# Patient Record
Sex: MALE | Race: BLACK OR AFRICAN AMERICAN | Employment: UNEMPLOYED | URBAN - METROPOLITAN AREA
[De-identification: names, ages, dates, MRNs, and addresses within clinical notes are randomized per-mention and may not be internally consistent; named-entity substitution may affect disease eponyms.]

---

## 2017-07-24 ENCOUNTER — OFFICE VISIT (OUTPATIENT)
Dept: ALLERGY | Facility: CLINIC | Age: 37
End: 2017-07-24
Payer: COMMERCIAL

## 2017-07-24 VITALS
HEART RATE: 81 BPM | HEIGHT: 66 IN | BODY MASS INDEX: 25.26 KG/M2 | OXYGEN SATURATION: 99 % | SYSTOLIC BLOOD PRESSURE: 120 MMHG | WEIGHT: 157.2 LBS | DIASTOLIC BLOOD PRESSURE: 83 MMHG

## 2017-07-24 DIAGNOSIS — H10.9 RHINOCONJUNCTIVITIS: Primary | ICD-10-CM

## 2017-07-24 DIAGNOSIS — Z51.6 NEED FOR DESENSITIZATION TO ALLERGENS: ICD-10-CM

## 2017-07-24 DIAGNOSIS — J31.0 RHINOCONJUNCTIVITIS: Primary | ICD-10-CM

## 2017-07-24 PROCEDURE — 86003 ALLG SPEC IGE CRUDE XTRC EA: CPT | Performed by: ALLERGY & IMMUNOLOGY

## 2017-07-24 PROCEDURE — 99204 OFFICE O/P NEW MOD 45 MIN: CPT | Performed by: ALLERGY & IMMUNOLOGY

## 2017-07-24 PROCEDURE — 86003 ALLG SPEC IGE CRUDE XTRC EA: CPT | Mod: 90 | Performed by: ALLERGY & IMMUNOLOGY

## 2017-07-24 PROCEDURE — 36415 COLL VENOUS BLD VENIPUNCTURE: CPT | Performed by: ALLERGY & IMMUNOLOGY

## 2017-07-24 PROCEDURE — 99000 SPECIMEN HANDLING OFFICE-LAB: CPT | Performed by: ALLERGY & IMMUNOLOGY

## 2017-07-24 RX ORDER — AZELASTINE 1 MG/ML
2 SPRAY, METERED NASAL 2 TIMES DAILY
Qty: 1 BOTTLE | Refills: 3 | Status: SHIPPED | OUTPATIENT
Start: 2017-07-24 | End: 2018-04-09

## 2017-07-24 RX ORDER — FLUTICASONE PROPIONATE 50 MCG
2 SPRAY, SUSPENSION (ML) NASAL DAILY
COMMUNITY

## 2017-07-24 RX ORDER — CETIRIZINE HYDROCHLORIDE 10 MG/1
10 TABLET ORAL DAILY
Qty: 30 TABLET | Refills: 3 | Status: SHIPPED | OUTPATIENT
Start: 2017-07-24 | End: 2018-04-09

## 2017-07-24 RX ORDER — EPINEPHRINE 0.3 MG/.3ML
0.3 INJECTION SUBCUTANEOUS PRN
Qty: 0.6 ML | Refills: 0 | Status: SHIPPED | OUTPATIENT
Start: 2017-07-24 | End: 2018-07-09

## 2017-07-24 RX ORDER — CETIRIZINE HYDROCHLORIDE 10 MG/1
10 TABLET ORAL DAILY
COMMUNITY
End: 2017-07-24

## 2017-07-24 NOTE — NURSING NOTE
RN educated on the symptoms and treatment of anaphylaxis. Went through the different ways that a reaction can present, and the body systems that it can affect. Instructed on when to use Epinephrine Auto Injector if she has an immunotherapy injection reaction. Patient verbalized understanding.     Writer demonstrated how to use an EpiPen auto-injector.  Patient instructed to form a fist around the auto-injector, remove blue safety release by pulling straight up, then firmly push orange tip against outer thigh so it clicks, holding for 5 seconds.  Patient advised that once used, needle will not be exposed, as orange tip extends.  Patient advised to call 911 or go to emergency department after epi-pen use for further monitoring.       Allergy immunotherapy consent was signed. Patient is aware that wait time of 30 minutes is required after injection. Understands that Epinephrine Autoinjector must be brought to each appointment. If He does not have his injector, the shot will not be given. Informed that patient is responsible for any remaining balance for allergy serum after it has been submitted to insurance.     Jaelyn Claire RN

## 2017-07-24 NOTE — NURSING NOTE
"Chief Complaint   Patient presents with     Consult       Initial /83 (BP Location: Right arm, Patient Position: Sitting, Cuff Size: Adult Regular)  Pulse 81  Ht 5' 5.67\" (1.668 m)  Wt 157 lb 3.2 oz (71.3 kg)  SpO2 99%  BMI 25.63 kg/m2 Estimated body mass index is 25.63 kg/(m^2) as calculated from the following:    Height as of this encounter: 5' 5.67\" (1.668 m).    Weight as of this encounter: 157 lb 3.2 oz (71.3 kg).  Medication Reconciliation: complete   Tiara Manzo MA      "

## 2017-07-24 NOTE — PROGRESS NOTES
Roxanna Tao is a 37 year old  male with previous medical history significant for allergic rhinitis. Roxanna Tao is being seen today for evaluation of seasonal allergies.     The patient reports that for the last 11 years he has had perennial without seasonal worsening nasal and ocular symptoms. Symptoms include rhinorrhea, nasal itching, sneezing, congestion, postnasal drainage, poor sense of smell, sinus headaches, ocular itching, ocular watering, ocular redness. He underwent allergy testing 78 years ago and remembers seeing positive for dust mites. They have allergen encasements on the bedding. They have no carpet. Flonase is used 3-4 days per week, but has not been deemed beneficial. Oral antihistamine has not been beneficial. Montelukast has been beneficial. He is using a refresh eyedrop which she thinks is beneficial. No ocular antihistamines. Symptoms are present day and night. Symptoms are present in doors and outdoors. Symptoms are made worse with dust, heat, humidity and infections. Otherwise nonatopic history. No problems around cats or dogs. He has never been on allergen immunotherapy.    The patient has no history of asthma, eczema, food allergies, medications allergies or hives.     ENVIRONMENTAL HISTORY: [place] The home is heated with a forced air. They does have central air conditioning. The patient's bedroom is furnished with carpeting in bedroom, allergen mattress cover and allergen pillowcase cover.  Pets inside the house include 0 . There is not history of cockroach or mice infestation. There is/are 0 smokers in the house.  The house does not have a damp basement.     History reviewed. No pertinent past medical history.  History reviewed. No pertinent family history.  History reviewed. No pertinent surgical history.    REVIEW OF SYSTEMS:  General: negative for weight gain. negative for weight loss. negative for changes in sleep.    Ears: negative for fullness. negative for hearing loss. negative for dizziness.   Nose: positive  for snoring.positive  for changes in smell. negative for drainage.   Eyes: positive  for eye watering. positive  for eye itching. negative for vision changes. positive  for eye redness.  Throat: positive  for hoarseness. positive for sore throat. negative for trouble swallowing.   Lungs: negative for shortness of breath.negative for wheezing. negative for sputum production.   Cardiovascular: negative for chest pain. negative for swelling of ankles. negative for fast or irregular heartbeat.   Gastrointestinal: negative for nausea. negative for heartburn. negative for acid reflux.   Musculoskeletal: negative for joint pain. negative for joint stiffness. negative for joint swelling.   Neurologic: negative for seizures. negative for fainting. negative for weakness.   Psychiatric: negative for changes in mood. negative for anxiety.   Endocrine: negative for cold intolerance. negative for heat intolerance. negative for tremors.   Lymphatic: negative for lower extremity swelling. negative for lymph node swelling.   Hematologic: negative for easy bruising. negative for easy bleeding.  Integumentary: negative for rash. negative for scaling. negative for nail changes.       Current Outpatient Prescriptions:      fluticasone (FLONASE) 50 MCG/ACT spray, Spray 2 sprays into both nostrils daily, Disp: , Rfl:      Montelukast Sodium (SINGULAIR PO), , Disp: , Rfl:      azelastine (ASTELIN) 0.1 % spray, Spray 2 sprays into both nostrils 2 times daily, Disp: 1 Bottle, Rfl: 3     cetirizine (ZYRTEC) 10 MG tablet, Take 1 tablet (10 mg) by mouth daily, Disp: 30 tablet, Rfl: 3     EPINEPHrine (EPIPEN/ADRENACLICK/OR ANY BX GENERIC EQUIV) 0.3 MG/0.3ML injection 2-pack, Inject 0.3 mLs (0.3 mg) into the muscle as needed for anaphylaxis, Disp: 0.6 mL, Rfl: 0    There is no immunization history on file for this patient.  No Known  Allergies      EXAM:   Constitutional:  Appears well-developed and well-nourished. No distress.   HEENT:   Head: Normocephalic.   Right Ear: External ear normal. TM normal  Left Ear: External ear normal. TM normal  Mouth/Throat: No oropharyngeal exudate present.   Cobblestoning of posterior oropharynx.   Boggy nasal tissue and pale.    Eyes: Conjunctivae are non-erythematous   No maxillary or frontal sinus tenderness to palpation.   Cardiovascular: Normal rate, regular rhythm and normal heart sounds. Exam reveals no gallop and no friction rub.   No murmur heard.  Respiratory: Effort normal and breath sounds normal. No respiratory distress. No wheezes. No rales.   Musculoskeletal: Normal range of motion.   Lymphadenopathy:   No cervical adenopathy.   No lower extremity edema.   Neuro: Oriented to person, place, and time.  Skin: Skin is warm and dry. No rash noted.   Psychiatric: Normal mood and affect.     Nursing note and vitals reviewed.    ASSESSMENT/PLAN:  Problem List Items Addressed This Visit        Infectious/Inflammatory    Rhinoconjunctivitis - Primary     Perennial nasal and ocular symptoms for the last 11 years. Flonase has been used and non-beneficial. Zyrtec, montelukast and other oral antihistamines have been non-beneficial. Using refresh eyedrops. Allergy testing 78 years ago positive for at least dust mites. Using that a pot and this has been beneficial. No history of allergy shots.    - Blood testing for environmental allergens. Could not obtain skin testing today secondary to antihistamine use.  - Flonase 50mcg 2 sprays/nostril q day. Use consistently.   - Azelastine 2 sprays/nostril twice daily as needed.   - Cetirizine (Zyrtec) 10mg by mouth daily.   - The patient has a history of allergic rhinoconjunctivitis and has exhausted all medical therapies without success in controlling symptoms. Immunotherapy was discussed as a treatment option with patient and family. Risks/benefits of immunotherapy  were discussed and the patient/family wishes to proceed with allergen immunotherapy.   - The patient will be prescribed a injectable epinephrine device and will need to bring this with them to allergy shot appointments and carry with them for the rest of the day after they receive the allergy shot. Discussed signs and symptoms of a systemic reaction and when to use injectable epinephrine.   - Oral antihistamine daily prior to receiving allergy shot.   - Return to clinic in 6 weeks.            Relevant Medications    azelastine (ASTELIN) 0.1 % spray    cetirizine (ZYRTEC) 10 MG tablet    Other Relevant Orders    Allergen cat epithellium IgE    Allergen dog epithelium IgE    Allergen Santy grass IgE    Allergen arely IgE    Allergen D pteronyssinus IgE    Allergen D farinae IgE    Allergen alternaria alternata IgE    Allergen aspergillus fumigatus IgE    Allergen cladosporium herbarum IgE    Allergen Epicoccum purpurascens IgE    Allergen penicillium notatum IgE    Allergen Red [place] IgE    Allergen lulu white IgE    Allergen Cedar IgE    Allergen silver  birch IgE    Allergen Tree White [place] IgE    Allergen elm IgE    Allergen cottonwood IgE    Allergen maple box elder IgE    Allergen oak white IgE    Allergen white pine IgE    Allergen [place] Tree    Allergen English plantain IgE    Allergen giant ragweed IgE    Allergen lamb's quarter IgE    Allergen Mugwort IgE    Allergen ragweed short IgE    Allergen Sagebrush Wormwood IgE    Allergen Sheep Sorrel IgE    Allergen thistle Russian IgE    Allergen Weed Nettle IgE    Allergen, Kochia/Firebush      Other Visit Diagnoses     Need for desensitization to allergens        Relevant Medications    EPINEPHrine (EPIPEN/ADRENACLICK/OR ANY BX GENERIC EQUIV) 0.3 MG/0.3ML injection 2-pack          Chart documentation with Dragon Voice recognition Software. Although reviewed after completion, some words and grammatical errors may remain.    Ray Mcleod,     Allergy/Immunology  [facility], [place], [place]

## 2017-07-24 NOTE — MR AVS SNAPSHOT
After Visit Summary   7/24/2017    Roxanna Tao    MRN: [id number]           Patient Information     Date Of Birth          1980        Visit Information        Provider Department      7/24/2017 10:45 AM Ray Mcleod DO; [facility]        Today's Diagnoses     Rhinoconjunctivitis    -  1    Need for desensitization to allergens          Care Instructions    Allergy Staff Appt Hours Shot Hours Locations    Physician     Ray Mcleod DO       Support Staff     SHLOMO Agustin MA  Monday:                      [place] 8-7     Tuesday:         [place] 8-5     Wednesday:        [place]: 7-5 Friday:        [place] 7-5 [place] Monday: 9-6 Friday: 7-2     [place]        Tuesday: 7-12 Thursday: 1-6 [place] Tuesday: 1-6 Wednesday: 11-6 Thursday: 7-12 [facility]  [address] [place] [address]  Appt Line: [phone]  Allergy RN (Monday):  [phone]    [facility]  [address] [place] [address]  Appt Line: [phone]  Allergy RN (Tues & Wed):  [phone]    [facility]  [address] [place] [address]  Appt Line: [phone]  Allergy RN (Friday):  [phone]       Important Scheduling Information  Aspirin Desensitization: Appt will last 2 clinic days. Please call the Allergy RN line for your clinic to schedule. Discontinue antihistamines 7 days prior to the appointment.     Food Challenges: Appt will last 3-4 hours. Please call the Allergy RN line for your clinic to schedule. Discontinue antihistamines 7 days prior to the appointment.     Penicillin Testing: Appt will last 2-3 hours. Please call the Allergy RN line for your clinic to schedule. Discontinue antihistamines 7 days prior to the appointment.     Skin Testing: Appt will about 40 minutes. Call the appointment line for your clinic to schedule. Discontinue antihistamines 7  days prior to the appointment.     Venom Testing: Appt will last 2-3 hours. Please call the Allergy RN line for your clinic to schedule. Discontinue antihistamines 7 days prior to the appointment.     Thank you for trusting us with your Allergy, Asthma, and Immunology care. Please feel free to contact us with any questions or concerns you may have.      - Flonase 50mcg 2 sprays/nostril daily.   - Azelastine 2 sprays/nostril twice daily as needed.   - Cetirizine (Zyrtec) 10mg by mouth daily.   - Okay to stay off of Singulair.   - Allergy blood testing today.   - Start allergy shots.   AEROALLERGEN AVOIDANCE INSTRUCTIONS  MOLD  Indoors, mold season is year round. Outdoors, most mold prefer seasons with high humidity. Mold prefers damp, dark, warm places. Here are some tips on how to avoid mold exposure.  1.  Keep humidity inside between 35-50% with air conditioning or dehumidifier. The humidity level can be checked with a meter from a hardware store.   2.  Clean surfaces where mold grows and dry wet areas.  3.  Avoid steam cleaning carpets and discard moldy belongings.  4.  Wear a mask when doing yard work and refrain from walking through uncut fields or playing in leaves.  5.  Minimize use of potted plants and do not keep them indoors.  6.  Consider an allergy cover for the pillow and mattress.  POLLEN  Pollens are the tiny airborne particles given off by trees, weeds, and grasses. They can be the cause of seasonal allergic rhinitis or hay fever symptoms, which include stuffy, itchy, runny nose, redness, swelling and itching of the eyes, and itching of the ears and throat. Here are some tips on how to avoid pollen exposure.  1. .Keep windows closed and use the air conditioner when possible.  2.  Avoid outside exposure in the early morning as pollen counts are highest at that time.  3.  Take a shower and wash hair each night.  4.  Consider wearing a mask when working in the yard and/or garden.  5.  Clean furnace  filter monthly with HEPA filters. Consider a HEPA filter vacuum  which will prevent pollen from being reintroduced into the air.   DUST MITES  Dust mites can never be entirely eliminated in the house no matter how clean your house is. Dust mites are attracted to warm, moist areas and feed on dead skin flakes. Here are tips to minimize dust mites in your home.  1.  Encase pillows and mattress/box springs in zippered allergy covers.  2.  Wash bedding in hot water (at least 130 F) every 7-14 days.  3.  Avoid curtains, carpet, and upholstered furniture if possible.  4.  Use HEPA air filters and a HEPA filter vacuum . Change filters monthly. Vacuum weekly.  5.  Keep bedroom simple, avoiding clutter, so it can quickly be dusted.  6.  Cover heating vents with vent filters.  7.  Keep stuffed toys in a closed container and wash or freeze regularly.  8.  Keep clothing in the closet with the door closed.  PETS  Pets present many problems for people with allergies. Dander from pets is very difficult to remove and also is a food source for dust mites.  1.  If possible, find the pet a new home.  2.  If not possible, keep the pet outdoors. Never allow the pet into the bedroom.  3.  Wash pet weekly in warm water.  4.  Encase mattresses, pillows, and box springs in allergen-proof covers.  5.  Use HEPA air filters and a HEPA filter vacuum . Change filters monthly.  Anaphylaxis Action Plan for Immunotherapy Patients    There is risk of systemic reactions when receiving immunotherapy injections. Local reactions such as a wheal (hive) smaller than a quarter, redness, swelling, and soreness are common. If the wheal is greater than the size of a half dollar (3.4 cm) please contact our clinic (numbers below), as we will need to adjust the dose that you receive at your next injection. Waiting until the next appointment to inform us of the reaction could increase the wait time that you experience, because your allergist  "will need to be contacted for new orders prior to giving the injection.  If you have symptoms not localized to the injection site, please follow the anaphylaxis plan, and contact our office to update after you have received emergency medical treatment. Please ask to speak to an Allergy RN with any questions or updates.     [facility]: [phone]  [facility] [phone]  [facility]: [phone]  [place]: [phone]  [phone]                Follow-ups after your visit        Follow-up notes from your care team     Return in about 6 weeks (around 2017).      Who to contact     If you have questions or need follow up information about today's clinic visit or your schedule please contact [facility] directly at [phone].  Normal or non-critical lab and imaging results will be communicated to you by MyChart, letter or phone within 4 business days after the clinic has received the results. If you do not hear from us within 7 days, please contact the clinic through MyChart or phone. If you have a critical or abnormal lab result, we will notify you by phone as soon as possible.  Submit refill requests through [employer] or call your pharmacy and they will forward the refill request to us. Please allow 3 business days for your refill to be completed.          Additional Information About Your Visit        MyChart Information     [employer] lets you send messages to your doctor, view your test results, renew your prescriptions, schedule appointments and more. To sign up, go to [email or web address] . Click on \"Log in\" on the left side of the screen, which will take you to the Welcome page. Then click on \"Sign up Now\" on the right side of the page.     You will be asked to enter the access code listed below, as well as some personal information. Please follow the directions to create your username and password.     Your access code is: [email or web address]  Expires: 10/22/2017 11:35 " "AM     Your access code will  in 90 days. If you need help or a new code, please call your [place] clinic or [phone].        Care EveryWhere ID     This is your Care EveryWhere ID. This could be used by other organizations to access your [place] medical records  [id number]        Your Vitals Were     Pulse Height Pulse Oximetry BMI (Body Mass Index)          81 5' 5.67\" (1.668 m) 99% 25.63 kg/m2         Blood Pressure from Last 3 Encounters:   17 120/83    Weight from Last 3 Encounters:   17 157 lb 3.2 oz (71.3 kg)              We Performed the Following     Allergen alternaria alternata IgE     Allergen lulu white IgE     Allergen aspergillus fumigatus IgE     Allergen cat epithellium IgE     Allergen Cedar IgE     Allergen cladosporium herbarum IgE     Allergen cottonwood IgE     Allergen D farinae IgE     Allergen D pteronyssinus IgE     Allergen dog epithelium IgE     Allergen elm IgE     Allergen English plantain IgE     Allergen Epicoccum purpurascens IgE     Allergen giant ragweed IgE     Allergen Santy grass IgE     Allergen lamb's quarter IgE     Allergen maple box elder IgE     Allergen Mugwort IgE     Allergen oak white IgE     Allergen penicillium notatum IgE     Allergen ragweed short IgE     Allergen Red [place] IgE     Allergen Sagebrush [place] IgE     Allergen Sheep Sorrel IgE     Allergen silver  birch IgE     Allergen thistle Russian IgE     Allergen arely IgE     Allergen Tree White [place] IgE     Allergen [place] Tree     Allergen Weed Nettle IgE     Allergen white pine IgE     Allergen, Kochia/Firebush          Today's Medication Changes          These changes are accurate as of: 17 11:51 AM.  If you have any questions, ask your nurse or doctor.               Start taking these medicines.        Dose/Directions    azelastine 0.1 % spray   Commonly known as:  ASTELIN   Used for:  Rhinoconjunctivitis   Started by:  Ray Mcleod, DO        Dose:  2 spray "   Spray 2 sprays into both nostrils 2 times daily   Quantity:  1 Bottle   Refills:  3       EPINEPHrine 0.3 MG/0.3ML injection 2-pack   Commonly known as:  EPIPEN/ADRENACLICK/or ANY BX GENERIC EQUIV   Used for:  Need for desensitization to allergens   Started by:  Ray Mcleod DO        Dose:  0.3 mg   Inject 0.3 mLs (0.3 mg) into the muscle as needed for anaphylaxis   Quantity:  0.6 mL   Refills:  0            Where to get your medicines      These medications were sent to [id number] - [place], [place] - [address]  [address], [place] [address]    Hours:  test fax sent successfully 7/31/03  Phone:  [phone]     azelastine 0.1 % spray    cetirizine 10 MG tablet    EPINEPHrine 0.3 MG/0.3ML injection 2-pack                Primary Care Provider    None Specified       No primary provider on file.        Equal Access to Services     [facility] : Lilliana Steen, waanjuda tami, qaybta kaalmada katelyn, viktor winters . So [facility] [phone].    ATENCIÓN: Si ayala olvera, tiene a mc disposición servicios gratuitos de asistencia lingüística. Llame al [phone].    We comply with applicable federal civil rights laws and [place] laws. We do not discriminate on the basis of race, color, national origin, age, disability sex, sexual orientation or gender identity.            Thank you!     Thank you for choosing [facility]  for your care. Our goal is always to provide you with excellent care. Hearing back from our patients is one way we can continue to improve our services. Please take a few minutes to complete the written survey that you may receive in the mail after your visit with us. Thank you!             Your Updated Medication List - Protect others around you: Learn how to safely use, store and throw away your medicines at www.disposemymeds.org.          This list is accurate as of: 7/24/17 11:51 AM.   Always use your most recent med list.                   Brand Name Dispense Instructions for use Diagnosis    azelastine 0.1 % spray    ASTELIN    1 Bottle    Spray 2 sprays into both nostrils 2 times daily    Rhinoconjunctivitis       cetirizine 10 MG tablet    zyrTEC    30 tablet    Take 1 tablet (10 mg) by mouth daily    Rhinoconjunctivitis       EPINEPHrine 0.3 MG/0.3ML injection 2-pack    EPIPEN/ADRENACLICK/or ANY BX GENERIC EQUIV    0.6 mL    Inject 0.3 mLs (0.3 mg) into the muscle as needed for anaphylaxis    Need for desensitization to allergens       fluticasone 50 MCG/ACT spray    FLONASE     Spray 2 sprays into both nostrils daily        SINGULAIR PO

## 2017-07-24 NOTE — ASSESSMENT & PLAN NOTE
Perennial nasal and ocular symptoms for the last 11 years. Flonase has been used and non-beneficial. Zyrtec, montelukast and other oral antihistamines have been non-beneficial. Using refresh eyedrops. Allergy testing 78 years ago positive for at least dust mites. Using that a pot and this has been beneficial. No history of allergy shots.    - Blood testing for environmental allergens. Could not obtain skin testing today secondary to antihistamine use.  - Flonase 50mcg 2 sprays/nostril q day. Use consistently.   - Azelastine 2 sprays/nostril twice daily as needed.   - Cetirizine (Zyrtec) 10mg by mouth daily.   - The patient has a history of allergic rhinoconjunctivitis and has exhausted all medical therapies without success in controlling symptoms. Immunotherapy was discussed as a treatment option with patient and family. Risks/benefits of immunotherapy were discussed and the patient/family wishes to proceed with allergen immunotherapy.   - The patient will be prescribed a injectable epinephrine device and will need to bring this with them to allergy shot appointments and carry with them for the rest of the day after they receive the allergy shot. Discussed signs and symptoms of a systemic reaction and when to use injectable epinephrine.   - Oral antihistamine daily prior to receiving allergy shot.   - Return to clinic in 6 weeks.

## 2017-07-24 NOTE — PATIENT INSTRUCTIONS
Allergy Staff Appt Hours Shot Hours Locations    Physician     Ray Mcleod DO       Support Staff     Jaelyn PATHAK RN      Tiara LYON MA  Monday:                      [place] 8-7     Tuesday:         [place] 8-5 Wednesday:        [place]: 7-5     Friday:        [place] 7-5   [place]        Monday: 9-6        Friday: 7-2     [place]        Tuesday: 7-12 Thursday: 1-6 [place] Tuesday: 1-6 Wednesday: 11-6 Thursday: 7-12 [facility]  [address] [place] [address]  Appt Line: [phone]  Allergy RN (Monday):  [phone]    [facility]  [address] [place] [address]  Appt Line: [phone]  Allergy RN (Tues & Wed):  [phone]    [facility]  [address] [place] [address]  Appt Line: [phone]  Allergy RN (Friday):  [phone]       Important Scheduling Information  Aspirin Desensitization: Appt will last 2 clinic days. Please call the Allergy RN line for your clinic to schedule. Discontinue antihistamines 7 days prior to the appointment.     Food Challenges: Appt will last 3-4 hours. Please call the Allergy RN line for your clinic to schedule. Discontinue antihistamines 7 days prior to the appointment.     Penicillin Testing: Appt will last 2-3 hours. Please call the Allergy RN line for your clinic to schedule. Discontinue antihistamines 7 days prior to the appointment.     Skin Testing: Appt will about 40 minutes. Call the appointment line for your clinic to schedule. Discontinue antihistamines 7 days prior to the appointment.     Venom Testing: Appt will last 2-3 hours. Please call the Allergy RN line for your clinic to schedule. Discontinue antihistamines 7 days prior to the appointment.     Thank you for trusting us with your Allergy, Asthma, and Immunology care. Please feel free to contact us with any questions or concerns you may have.      - Flonase 50mcg 2 sprays/nostril daily.   - Azelastine 2 sprays/nostril  twice daily as needed.   - Cetirizine (Zyrtec) 10mg by mouth daily.   - Okay to stay off of Singulair.   - Allergy blood testing today.   - Start allergy shots.   AEROALLERGEN AVOIDANCE INSTRUCTIONS  MOLD  Indoors, mold season is year round. Outdoors, most mold prefer seasons with high humidity. Mold prefers damp, dark, warm places. Here are some tips on how to avoid mold exposure.  1.  Keep humidity inside between 35-50% with air conditioning or dehumidifier. The humidity level can be checked with a meter from a hardware store.   2.  Clean surfaces where mold grows and dry wet areas.  3.  Avoid steam cleaning carpets and discard moldy belongings.  4.  Wear a mask when doing yard work and refrain from walking through uncut fields or playing in leaves.  5.  Minimize use of potted plants and do not keep them indoors.  6.  Consider an allergy cover for the pillow and mattress.  POLLEN  Pollens are the tiny airborne particles given off by trees, weeds, and grasses. They can be the cause of seasonal allergic rhinitis or hay fever symptoms, which include stuffy, itchy, runny nose, redness, swelling and itching of the eyes, and itching of the ears and throat. Here are some tips on how to avoid pollen exposure.  1. .Keep windows closed and use the air conditioner when possible.  2.  Avoid outside exposure in the early morning as pollen counts are highest at that time.  3.  Take a shower and wash hair each night.  4.  Consider wearing a mask when working in the yard and/or garden.  5.  Clean furnace filter monthly with HEPA filters. Consider a HEPA filter vacuum  which will prevent pollen from being reintroduced into the air.   DUST MITES  Dust mites can never be entirely eliminated in the house no matter how clean your house is. Dust mites are attracted to warm, moist areas and feed on dead skin flakes. Here are tips to minimize dust mites in your home.  1.  Encase pillows and mattress/box springs in zippered allergy  covers.  2.  Wash bedding in hot water (at least 130 F) every 7-14 days.  3.  Avoid curtains, carpet, and upholstered furniture if possible.  4.  Use HEPA air filters and a HEPA filter vacuum . Change filters monthly. Vacuum weekly.  5.  Keep bedroom simple, avoiding clutter, so it can quickly be dusted.  6.  Cover heating vents with vent filters.  7.  Keep stuffed toys in a closed container and wash or freeze regularly.  8.  Keep clothing in the closet with the door closed.  PETS  Pets present many problems for people with allergies. Dander from pets is very difficult to remove and also is a food source for dust mites.  1.  If possible, find the pet a new home.  2.  If not possible, keep the pet outdoors. Never allow the pet into the bedroom.  3.  Wash pet weekly in warm water.  4.  Encase mattresses, pillows, and box springs in allergen-proof covers.  5.  Use HEPA air filters and a HEPA filter vacuum . Change filters monthly.  Anaphylaxis Action Plan for Immunotherapy Patients    There is risk of systemic reactions when receiving immunotherapy injections. Local reactions such as a wheal (hive) smaller than a quarter, redness, swelling, and soreness are common. If the wheal is greater than the size of a half dollar (3.4 cm) please contact our clinic (numbers below), as we will need to adjust the dose that you receive at your next injection. Waiting until the next appointment to inform us of the reaction could increase the wait time that you experience, because your allergist will need to be contacted for new orders prior to giving the injection.  If you have symptoms not localized to the injection site, please follow the anaphylaxis plan, and contact our office to update after you have received emergency medical treatment. Please ask to speak to an Allergy RN with any questions or updates.     [facility]: [phone]  [facility]): [phone]  [facility]: [phone]  [place]:  [phone]  [phone]

## 2017-07-25 LAB
CALIF WALNUT IGE QN: NORMAL
DEPRECATED MISC ALLERGEN IGE RAST QL: NORMAL

## 2017-07-27 LAB
A ALTERNATA IGE QN: NORMAL KU(A)/L
A FUMIGATUS IGE QN: NORMAL KU(A)/L
C HERBARUM IGE QN: NORMAL KU(A)/L
CAT DANDER IGG QN: NORMAL KU(A)/L
CEDAR IGE QN: NORMAL KU(A)/L
COMMON RAGWEED IGE QN: NORMAL KU(A)/L
COTTONWOOD IGE QN: NORMAL KU(A)/L
D FARINAE IGE QN: 1.27 KU(A)/L
D PTERONYSS IGE QN: 1.99 KU(A)/L
DOG DANDER+EPITH IGE QN: NORMAL KU(A)/L
E PURPURASCENS IGE QN: NORMAL KU(A)/L
EAST WHITE PINE IGE QN: NORMAL KU(A)/L
ENGL PLANTAIN IGE QN: NORMAL KU(A)/L
FIREBUSH IGE QN: NORMAL KU(A)/L
GIANT RAGWEED IGE QN: NORMAL KU(A)/L
GOOSEFOOT IGE QN: 0.12 KU(A)/L
JOHNSON GRASS IGE QN: NORMAL KU(A)/L
MAPLE IGE QN: NORMAL KU(A)/L
MUGWORT IGE QN: NORMAL KU(A)/L
P NOTATUM IGE QN: NORMAL KU(A)/L
RED MULBERRY IGE QN: NORMAL KU(A)/L
SALTWORT IGE QN: NORMAL KU(A)/L
SHEEP SORREL IGE QN: NORMAL KU(A)/L
SILVER BIRCH IGE QN: NORMAL KU(A)/L
TIMOTHY IGE QN: NORMAL KU(A)/L
WHITE ASH IGE QN: 0.1 KU(A)/L
WHITE ELM IGE QN: NORMAL KU(A)/L
WHITE OAK IGE QN: NORMAL KU(A)/L
WORMWOOD IGE QN: NORMAL KU(A)/L

## 2017-07-27 NOTE — PROGRESS NOTES
Blood testing positive for dust mites, weeds and trees. Will create allergy shots based on these results. Please call and confirm with the patient that he wishes to proceed. I think he does, but he maybe checking with insurance. Thanks.     AEROALLERGEN AVOIDANCE INSTRUCTIONS  MOLD  Indoors, mold season is year round. Outdoors, most mold prefer seasons with high humidity. Mold prefers damp, dark, warm places. Here are some tips on how to avoid mold exposure.   Keep humidity inside between 35-50% with air conditioning or dehumidifier. The humidity level can be checked with a meter from a hardware store.    Clean surfaces where mold grows and dry wet areas.   Avoid steam cleaning carpets and discard moldy belongings.   Wear a mask when doing yard work and refrain from walking through uncut fields or playing in leaves.   Minimize use of potted plants and do not keep them indoors.   Consider an allergy cover for the pillow and mattress.  POLLEN  Pollens are the tiny airborne particles given off by trees, weeds, and grasses. They can be the cause of seasonal allergic rhinitis or hay fever symptoms, which include stuffy, itchy, runny nose, redness, swelling and itching of the eyes, and itching of the ears and throat. Here are some tips on how to avoid pollen exposure.  .Keep windows closed and use the air conditioner when possible.   Avoid outside exposure in the early morning as pollen counts are highest at that time.   Take a shower and wash hair each night.   Consider wearing a mask when working in the yard and/or garden.   Clean furnace filter monthly with HEPA filters. Consider a HEPA filter vacuum  which will prevent pollen from being reintroduced into the air.   DUST MITES  Dust mites can never be entirely eliminated in the house no matter how clean your house is. Dust mites are attracted to warm, moist areas and feed on dead skin flakes. Here are tips to minimize dust mites in your home.   Encase pillows  and mattress/box springs in zippered allergy covers.   Wash bedding in hot water (at least 130 F) every 7-14 days.   Avoid curtains, carpet, and upholstered furniture if possible.   Use HEPA air filters and a HEPA filter vacuum . Change filters monthly. Vacuum weekly.   Keep bedroom simple, avoiding clutter, so it can quickly be dusted.   Cover heating vents with vent filters.   Keep stuffed toys in a closed container and wash or freeze regularly.   Keep clothing in the closet with the door closed.  PETS  Pets present many problems for people with allergies. Dander from pets is very difficult to remove and also is a food source for dust mites.   If possible, find the pet a new home.   If not possible, keep the pet outdoors. Never allow the pet into the bedroom.   Wash pet weekly in warm water.   Encase mattresses, pillows, and box springs in allergen-proof covers.   Use HEPA air filters and a HEPA filter vacuum . Change filters monthly.      Ray Mcleod

## 2017-07-28 LAB
NETTLE IGE QN: NORMAL KU(A)/L
WHITE MULBERRY IGE QN: NORMAL

## 2017-08-14 ENCOUNTER — TELEPHONE (OUTPATIENT)
Dept: ALLERGY | Facility: CLINIC | Age: 37
End: 2017-08-14

## 2017-08-14 DIAGNOSIS — J30.89 ALLERGIC RHINITIS DUE TO DUST MITE: Primary | ICD-10-CM

## 2017-08-14 NOTE — TELEPHONE ENCOUNTER
Patient left message on RN's voicemail stating that allergy immunotherapy serums can be ordered. Routing to provider to order serums.     Jaelyn Claire RN

## 2017-08-14 NOTE — TELEPHONE ENCOUNTER
Patient is calling stating he received a message from nurse about his allergy shots and would like to discuss. Please call to discuss. Thank you.

## 2017-08-14 NOTE — TELEPHONE ENCOUNTER
ALLERGY SOLUTION NEW REQUEST    Roxanna WAGNER Dinh 1980 MRN: [id number]    DATE NEEDED:  2 weeks  Vial Color Content   Top Dose         Vial Size  Green 1:1,000, Blue 1:100, Yellow 1:10 and Red 1:1 Dust Mite, Trees, Weeds  Red 1:1 0.5 5mL      Shot Clinic Location:  [place]  Ship to Location: [place]  Special Instructions:  none      Updated Prescription Needed: No      Requester Signature  aRy Mcleod

## 2017-08-14 NOTE — TELEPHONE ENCOUNTER
RN left message for patient to return call to RN's direct line @ [phone].    Jaelyn Claire RN

## 2017-08-18 ENCOUNTER — TELEPHONE (OUTPATIENT)
Dept: ALLERGY | Facility: CLINIC | Age: 37
End: 2017-08-18

## 2017-08-18 DIAGNOSIS — J30.2 SEASONAL ALLERGIC RHINITIS: Primary | ICD-10-CM

## 2017-08-18 PROCEDURE — 95165 ANTIGEN THERAPY SERVICES: CPT | Performed by: ALLERGY & IMMUNOLOGY

## 2017-08-18 NOTE — TELEPHONE ENCOUNTER
Allergy serums received at [place].     Vials received below:    Vial Color Content                      Vial Size Expiration Date  Green 1:1,000 Dust Mite, Trees, Weeds 5mL  2/16/2018  Blue 1:100 Dust Mite, Trees, Weeds 5mL  8/16/2018  Yellow 1:10 Dust Mite, Trees, Weeds 5mL  8/16/2018  Red 1:1 Dust Mite, Trees, Weeds 5mL  8/16/2018      Karri eLmus

## 2017-08-18 NOTE — TELEPHONE ENCOUNTER
Called pt and left message stating that allergy serum has been received at the [facility]. Ok to set up appointments to receive allergy injection every 3-14 days. Gave pt main clinic number to schedule appointments. Also advised pt to bring epipen to every allergy injection appointment and ok to continue allergy medication while receiving allergy injections. Request pt call [phone] with any further questions.    Rita Lemus CMA ....... 8/18/2017 8:23 AM

## 2017-08-18 NOTE — PROGRESS NOTES
Allergy serums billed at [place].     Vials received below:    Vial Color Content                      Vial Size Expiration Date  Green 1:1,000 Dust Mite, Trees, Weeds 5mL  2/16/2018  Blue 1:100 Dust Mite, Trees, Weeds 5mL  8/16/2018  Yellow 1:10 Dust Mite, Trees, Weeds 5mL  8/16/2018  Red 1:1 Dust Mite, Trees, Weeds 5mL  8/16/2017    Original Refill encounter date: 8/14/2017      Signature  Rita Lemus

## 2017-09-01 ENCOUNTER — ALLIED HEALTH/NURSE VISIT (OUTPATIENT)
Dept: ALLERGY | Facility: CLINIC | Age: 37
End: 2017-09-01
Payer: COMMERCIAL

## 2017-09-01 DIAGNOSIS — J30.9 ALLERGIC RHINITIS, UNSPECIFIED: Primary | ICD-10-CM

## 2017-09-01 PROCEDURE — 95115 IMMUNOTHERAPY ONE INJECTION: CPT

## 2017-09-01 NOTE — PROGRESS NOTES
Patient started allergy injections today. Reviewed new patient instructions including:     Prior to visit patient should:   - Bring epinephrine auto-injector to every appointment, this is for patient safety  - We reviewed how to use your epinephrine auto-injector if needed   -  If directed by your provider, take an over the counter anti-histamine at least 60 minutes prior to appointment (allegra, claritin, zyrtec are all options)   - This can be in addition to medications patient is already taking for allergy  symptoms    At time of visit:   -Patient will be asked a series of questions every time, reviewed these questions and implications.   -Patient must present epinephrine auto-injector  -Patient must wait 30 minutes post injections, reviewed this is for patient safety. Patient should watch the time/and return to injection room for assessment of sites before leaving.  -If patient were to experience signs of a systemic reaction (reviewed what these are), patient should present to injection room and notify nursing staff immediately   -Patient may not go to other appointments/activities within the clinic during the 30 minute wait time.     Post Visit:   -Reviewed signs of systemic reaction/anaphylaxis and what to do if these were to occur  -Reviewed signs of a normal local reaction and when to call the clinic  -Reviewed dosing schedule, and assisted with/or encouraged patient to schedule additional appointments  -Avoid avid vigorous activity from right before each injection until 2 hours after the injection     *Patient handout given with this information.     Vandana Norris RN

## 2017-09-01 NOTE — PROGRESS NOTES
Patient presented after waiting 30 minutes with no reaction to allergy injections. Discharged from clinic.    Vandana Norris RN ....... 9/1/2017 1:46 PM

## 2017-09-01 NOTE — MR AVS SNAPSHOT
After Visit Summary   9/1/2017    Roxanna Tao    MRN: [id number]           Patient Information     Date Of Birth          1980        Visit Information        Provider Department      9/1/2017 1:00 PM AN ALLERGY SHOTS [facility] Instructions    Patient started allergy injections today. Reviewed new patient instructions including:     Prior to visit patient should:   - Bring epinephrine auto-injector to every appointment, this is for patient safety  - We reviewed how to use your epinephrine auto-injector if needed   -  If directed by your provider, take an over the counter anti-histamine at least 60 minutes prior to appointment (allegra, claritin, zyrtec are all options)   - This can be in addition to medications patient is already taking for allergy  symptoms    At time of visit:   -Patient will be asked a series of questions every time, reviewed these questions and implications.   -Patient must present epinephrine auto-injector  -Patient must wait 30 minutes post injections, reviewed this is for patient safety. Patient should watch the time/and return to injection room for assessment of sites before leaving.  -If patient were to experience signs of a systemic reaction (reviewed what these are), patient should present to injection room and notify nursing staff immediately   -Patient may not go to other appointments/activities within the clinic during the 30 minute wait time.     Post Visit:   -Reviewed signs of systemic reaction/anaphylaxis and what to do if these were to occur  -Reviewed signs of a normal local reaction and when to call the clinic  -Reviewed dosing schedule, and assisted with/or encouraged patient to schedule additional appointments  -Avoid avid vigorous activity from right before each injection until 2 hours after the injection     *Patient handout given with this information.     Anaphylaxis Action Plan for Immunotherapy Patients    There is risk of  systemic reactions when receiving immunotherapy injections. Local reactions such as a wheal (hive) smaller than a quarter, redness, swelling, and soreness are common. If the wheal is greater than the size of a half dollar (3.4 cm) please contact our clinic (numbers below), as we will need to adjust the dose that you receive at your next injection. Waiting until the next appointment to inform us of the reaction could increase the wait time that you experience, because your allergist will need to be contacted for new orders prior to giving the injection.  If you have symptoms not localized to the injection site, please follow the anaphylaxis plan, and contact our office to update after you have received emergency medical treatment. Please ask to speak to an Allergy RN with any questions or updates.     [facility]: [phone]  [facility]: [phone]  [facility]: [phone]  [place]: [phone]  [phone]                Follow-ups after your visit        Your next 10 appointments already scheduled     Sep 01, 2017  1:00 PM CDT   Nurse Only with AN ALLERGY SHOTS   [facility]    [address] [address] [phone]            Sep 08, 2017  1:00 PM CDT   Nurse Only with AN ALLERGY SHOTS   [facility]    [address] [address] [phone]              Who to contact     If you have questions or need follow up information about today's clinic visit or your schedule please contact [facility] directly at [phone].  Normal or non-critical lab and imaging results will be communicated to you by MyChart, letter or phone within 4 business days after the clinic has received the results. If you do not hear from us within 7 days, please contact the clinic through MyChart or phone. If you have a critical or abnormal lab result, we will notify you by phone  "as soon as possible.  Submit refill requests through LOANZ or call your pharmacy and they will forward the refill request to us. Please allow 3 business days for your refill to be completed.          Additional Information About Your Visit        [employer] Information     LOANZ lets you send messages to your doctor, view your test results, renew your prescriptions, schedule appointments and more. To sign up, go to [email or web address] . Click on \"Log in\" on the left side of the screen, which will take you to the Welcome page. Then click on \"Sign up Now\" on the right side of the page.     You will be asked to enter the access code listed below, as well as some personal information. Please follow the directions to create your username and password.     Your access code is: [email or web address]  Expires: 10/22/2017 11:35 AM     Your access code will  in 90 days. If you need help or a new code, please call your [place] clinic or [phone].        Care EveryWhere ID     This is your Care EveryWhere ID. This could be used by other organizations to access your [place] medical records  [id number]         Blood Pressure from Last 3 Encounters:   17 120/83    Weight from Last 3 Encounters:   17 71.3 kg (157 lb 3.2 oz)              Today, you had the following     No orders found for display       Primary Care Provider    None Specified       No primary provider on file.        Equal Access to Services     [facility]: Hadii alice hwang hadasho Sosurinderali, waaxda luqadaha, qaybta kaalmada adesaulyada, viktor winters . So [facility] [phone].    ATENCIÓN: Si habla español, tiene a mc disposición servicios gratuitos de asistencia lingüística. Llame al [phone].    We comply with applicable federal civil rights laws and [place] laws. We do not discriminate on the basis of race, color, national origin, age, disability sex, sexual orientation or gender identity.            Thank you!     " Thank you for choosing [facility]  for your care. Our goal is always to provide you with excellent care. Hearing back from our patients is one way we can continue to improve our services. Please take a few minutes to complete the written survey that you may receive in the mail after your visit with us. Thank you!             Your Updated Medication List - Protect others around you: Learn how to safely use, store and throw away your medicines at www.disposemymeds.org.          This list is accurate as of: 9/1/17 12:54 PM.  Always use your most recent med list.                   Brand Name Dispense Instructions for use Diagnosis    ALLERGEN IMMUNOTHERAPY PRESCRIPTION     5 mL    Dust Mites DF 30,000AU/mL, HS  0.3 ml Dust Mites DP. 30,000 AU/mL, HS  0.3 ml  Jose, White 1:20 w/v, HS  1.0 ml [place] 1:20 w/v, HS 1.0 ml Diluent: HSA qs to 5ml    Allergic rhinitis due to dust mite       azelastine 0.1 % spray    ASTELIN    1 Bottle    Spray 2 sprays into both nostrils 2 times daily    Rhinoconjunctivitis       cetirizine 10 MG tablet    zyrTEC    30 tablet    Take 1 tablet (10 mg) by mouth daily    Rhinoconjunctivitis       EPINEPHrine 0.3 MG/0.3ML injection 2-pack    EPIPEN/ADRENACLICK/or ANY BX GENERIC EQUIV    0.6 mL    Inject 0.3 mLs (0.3 mg) into the muscle as needed for anaphylaxis    Need for desensitization to allergens       fluticasone 50 MCG/ACT spray    FLONASE     Spray 2 sprays into both nostrils daily        SINGULAIR PO

## 2017-09-01 NOTE — PATIENT INSTRUCTIONS
Patient started allergy injections today. Reviewed new patient instructions including:     Prior to visit patient should:   - Bring epinephrine auto-injector to every appointment, this is for patient safety  - We reviewed how to use your epinephrine auto-injector if needed   -  If directed by your provider, take an over the counter anti-histamine at least 60 minutes prior to appointment (allegra, claritin, zyrtec are all options)   - This can be in addition to medications patient is already taking for allergy  symptoms    At time of visit:   -Patient will be asked a series of questions every time, reviewed these questions and implications.   -Patient must present epinephrine auto-injector  -Patient must wait 30 minutes post injections, reviewed this is for patient safety. Patient should watch the time/and return to injection room for assessment of sites before leaving.  -If patient were to experience signs of a systemic reaction (reviewed what these are), patient should present to injection room and notify nursing staff immediately   -Patient may not go to other appointments/activities within the clinic during the 30 minute wait time.     Post Visit:   -Reviewed signs of systemic reaction/anaphylaxis and what to do if these were to occur  -Reviewed signs of a normal local reaction and when to call the clinic  -Reviewed dosing schedule, and assisted with/or encouraged patient to schedule additional appointments  -Avoid avid vigorous activity from right before each injection until 2 hours after the injection     *Patient handout given with this information.     Anaphylaxis Action Plan for Immunotherapy Patients    There is risk of systemic reactions when receiving immunotherapy injections. Local reactions such as a wheal (hive) smaller than a quarter, redness, swelling, and soreness are common. If the wheal is greater than the size of a half dollar (3.4 cm) please contact our clinic (numbers below), as we will need to  adjust the dose that you receive at your next injection. Waiting until the next appointment to inform us of the reaction could increase the wait time that you experience, because your allergist will need to be contacted for new orders prior to giving the injection.  If you have symptoms not localized to the injection site, please follow the anaphylaxis plan, and contact our office to update after you have received emergency medical treatment. Please ask to speak to an Allergy RN with any questions or updates.     [facility]: [phone]  [facility]: [phone]  [facility]: [phone]  [facility]: [phone]  [phone]

## 2017-09-08 ENCOUNTER — ALLIED HEALTH/NURSE VISIT (OUTPATIENT)
Dept: ALLERGY | Facility: CLINIC | Age: 37
End: 2017-09-08
Payer: COMMERCIAL

## 2017-09-08 DIAGNOSIS — J30.9 ALLERGIC RHINITIS, UNSPECIFIED: Primary | ICD-10-CM

## 2017-09-08 PROCEDURE — 95115 IMMUNOTHERAPY ONE INJECTION: CPT

## 2017-09-08 NOTE — PROGRESS NOTES
Patient presented after waiting 30 minutes with no reaction to allergy injections. Discharged from clinic.    Vandana Norris RN ....... 9/8/2017 1:48 PM

## 2017-09-08 NOTE — MR AVS SNAPSHOT
After Visit Summary   9/8/2017    Roxanna Tao    MRN: [id number]           Patient Information     Date Of Birth          1980        Visit Information        Provider Department      9/8/2017 1:00 PM AN ALLERGY SHOTS [facility] [place]        Today's Diagnoses     Allergic rhinitis, unspecified    -  1       Follow-ups after your visit        Your next 10 appointments already scheduled     Sep 15, 2017  1:00 PM CDT   Nurse Only with AN ALLERGY SHOTS   [facility] [place] ([facility] [place])    [address]  [place] [address]   [phone]            Sep 22, 2017 12:30 PM CDT   Nurse Only with AN ALLERGY SHOTS   [facility] [place] ([facility] [place])    [address]  [place] [address]   [phone]            Sep 29, 2017 12:30 PM CDT   Nurse Only with AN ALLERGY SHOTS   [facility] [place] ([facility] [place])    [address]  [place] [address]   [phone]            Oct 06, 2017  1:00 PM CDT   Nurse Only with AN ALLERGY SHOTS   [facility] [place] ([facility] [place])    [address]  [place] [address]   [phone]            Oct 13, 2017  1:00 PM CDT   Nurse Only with AN ALLERGY SHOTS   [facility] [place] ([facility] [place])    [address]  [place] [address]   [phone]            Oct 20, 2017  1:00 PM CDT   Nurse Only with AN ALLERGY SHOTS   [facility] [place] ([facility] [place])    [address]  [place] [address]   [phone]            Oct 27, 2017  1:00 PM CDT   Nurse Only with AN ALLERGY SHOTS   [facility] [place] ([facility] [place])    [address]  [place] [address] [phone]              Who to contact     If you have questions or need follow up information about today's clinic visit or your schedule please contact [facility] directly at [phone].  Normal or non-critical lab  "and imaging results will be communicated to you by MyChart, letter or phone within 4 business days after the clinic has received the results. If you do not hear from us within 7 days, please contact the clinic through [employer] or phone. If you have a critical or abnormal lab result, we will notify you by phone as soon as possible.  Submit refill requests through [employer] or call your pharmacy and they will forward the refill request to us. Please allow 3 business days for your refill to be completed.          Additional Information About Your Visit        myaNUMBERharSurroundsMe Information     [employer] lets you send messages to your doctor, view your test results, renew your prescriptions, schedule appointments and more. To sign up, go to [email or web address] . Click on \"Log in\" on the left side of the screen, which will take you to the Welcome page. Then click on \"Sign up Now\" on the right side of the page.     You will be asked to enter the access code listed below, as well as some personal information. Please follow the directions to create your username and password.     Your access code is: [email or web address]  Expires: 10/22/2017 11:35 AM     Your access code will  in 90 days. If you need help or a new code, please call your [facility] clinic or [phone].        Care EveryWhere ID     This is your Care EveryWhere ID. This could be used by other organizations to access your [facility] medical records  [id number]         Blood Pressure from Last 3 Encounters:   17 120/83    Weight from Last 3 Encounters:   17 71.3 kg (157 lb 3.2 oz)              We Performed the Following     Allergy Shot: One injection        Primary Care Provider    None Specified       No primary provider on file.        Equal Access to Services     [facility]: Hadii alice Steen, alejandro garrett, qaybta viktor meléndez . So [facility] [phone].    ATENCIÓN: Si ananyala español, tiene a mc " disposición servicios gratuitos de asistencia lingüística. Laura pollock [phone].    We comply with applicable federal civil rights laws and [place] laws. We do not discriminate on the basis of race, color, national origin, age, disability sex, sexual orientation or gender identity.            Thank you!     Thank you for choosing [facility]  for your [facility]. Our goal is always to provide you with excellent care. Hearing back from our patients is one way we can continue to improve our services. Please take a few minutes to complete the written survey that you may receive in the mail after your visit with us. Thank you!             Your Updated Medication List - Protect others around you: Learn how to safely use, store and throw away your medicines at www.disposemymeds.org.          This list is accurate as of: 9/8/17  1:48 PM.  Always use your most recent med list.                   Brand Name Dispense Instructions for use Diagnosis    ALLERGEN IMMUNOTHERAPY PRESCRIPTION     5 mL    Dust Mites DF 30,000AU/mL, HS  0.3 ml Dust Mites DP. 30,000 AU/mL, HS  0.3 ml  Jose, White 1:20 w/v, HS  1.0 ml Lamb's Quarters 1:20 w/v, HS 1.0 ml Diluent: HSA qs to 5ml    Allergic rhinitis due to dust mite       azelastine 0.1 % spray    ASTELIN    1 Bottle    Spray 2 sprays into both nostrils 2 times daily    Rhinoconjunctivitis       cetirizine 10 MG tablet    zyrTEC    30 tablet    Take 1 tablet (10 mg) by mouth daily    Rhinoconjunctivitis       EPINEPHrine 0.3 MG/0.3ML injection 2-pack    EPIPEN/ADRENACLICK/or ANY BX GENERIC EQUIV    0.6 mL    Inject 0.3 mLs (0.3 mg) into the muscle as needed for anaphylaxis    Need for desensitization to allergens       fluticasone 50 MCG/ACT spray    FLONASE     Spray 2 sprays into both nostrils daily        SINGULAIR PO

## 2017-09-15 ENCOUNTER — ALLIED HEALTH/NURSE VISIT (OUTPATIENT)
Dept: ALLERGY | Facility: CLINIC | Age: 37
End: 2017-09-15
Payer: COMMERCIAL

## 2017-09-15 DIAGNOSIS — J30.9 ALLERGIC RHINITIS, UNSPECIFIED: Primary | ICD-10-CM

## 2017-09-15 PROCEDURE — 95115 IMMUNOTHERAPY ONE INJECTION: CPT

## 2017-09-15 NOTE — PROGRESS NOTES
Patient did not return after 30 minutes for post-injection assessment and also not found in lobby waiting area.    Vandana Norris RN ....... 9/15/2017 1:41 PM

## 2017-09-15 NOTE — MR AVS SNAPSHOT
After Visit Summary   9/15/2017    Roxanna Tao    MRN: [id number]           Patient Information     Date Of Birth          1980        Visit Information        Provider Department      9/15/2017 1:00 PM AN ALLERGY SHOTS [facility] [place]        Today's Diagnoses     Allergic rhinitis, unspecified    -  1       Follow-ups after your visit        Your next 10 appointments already scheduled     Sep 22, 2017 12:30 PM CDT   Nurse Only with AN ALLERGY SHOTS   [facility] [place] ([facility] [place])    [address]  [place] [address]   [phone]            Sep 29, 2017 12:30 PM CDT   Nurse Only with AN ALLERGY SHOTS   [facility] [place] ([facility] [place])    [address]  [place] [address]   [phone]            Oct 06, 2017  1:00 PM CDT   Nurse Only with AN ALLERGY SHOTS   [facility] [place] ([facility] [place])    [address]  [place] [address] [phone]            Oct 13, 2017  1:00 PM CDT   Nurse Only with AN ALLERGY SHOTS   [facility] [place] ([facility] [place])    [address]  [place] [address]   [phone]            Oct 20, 2017  1:00 PM CDT   Nurse Only with AN ALLERGY SHOTS   [facility] [place] ([facility] [place])    [address]  [place] [address]   [phone]            Oct 27, 2017  1:00 PM CDT   Nurse Only with AN ALLERGY SHOTS   [facility] [place] ([facility] [place])    [address]  [place] [address] [phone]              Who to contact     If you have questions or need follow up information about today's clinic visit or your schedule please contact [facility] directly at [phone].  Normal or non-critical lab and imaging results will be communicated to you by MyChart, letter or phone within 4 business days after the clinic has received the results. If you do not hear from us within 7 days,  "please contact the clinic through [employer] or phone. If you have a critical or abnormal lab result, we will notify you by phone as soon as possible.  Submit refill requests through [employer] or call your pharmacy and they will forward the refill request to us. Please allow 3 business days for your refill to be completed.          Additional Information About Your Visit        [employer] Information     [employer] lets you send messages to your doctor, view your test results, renew your prescriptions, schedule appointments and more. To sign up, go to [email or web address] . Click on \"Log in\" on the left side of the screen, which will take you to the Welcome page. Then click on \"Sign up Now\" on the right side of the page.     You will be asked to enter the access code listed below, as well as some personal information. Please follow the directions to create your username and password.     Your access code is: [email or web address]  Expires: 10/22/2017 11:35 AM     Your access code will  in 90 days. If you need help or a new code, please call your [facility] clinic or [phone].        Care EveryWhere ID     This is your Care EveryWhere ID. This could be used by other organizations to access your [facility] medical records  [id number]         Blood Pressure from Last 3 Encounters:   17 120/83    Weight from Last 3 Encounters:   17 71.3 kg (157 lb 3.2 oz)              We Performed the Following     Allergy Shot: One injection        Primary Care Provider    None Specified       No primary provider on file.        Equal Access to Services     : Hadii aad ku hadasho Soomaali, waaxda luqadaha, qaybta kaalmada adeegyada, waxay carine winters . So [facility] [place] [phone].    ATENCIÓN: Si habla español, tiene a mc disposición servicios gratuitos de asistencia lingüística. Llame al [phone].    We comply with applicable federal civil rights laws and [place] laws. We do not discriminate on " the basis of race, color, national origin, age, disability sex, sexual orientation or gender identity.            Thank you!     Thank you for choosing [facility]  for your [facility]. Our goal is always to provide you with excellent care. Hearing back from our patients is one way we can continue to improve our services. Please take a few minutes to complete the written survey that you may receive in the mail after your visit with us. Thank you!             Your Updated Medication List - Protect others around you: Learn how to safely use, store and throw away your medicines at www.disposemymeds.org.          This list is accurate as of: 9/15/17  1:41 PM.  Always use your most recent med list.                   Brand Name Dispense Instructions for use Diagnosis    ALLERGEN IMMUNOTHERAPY PRESCRIPTION     5 mL    Dust Mites DF 30,000AU/mL, HS  0.3 ml Dust Mites DP. 30,000 AU/mL, HS  0.3 ml  Jose, White 1:20 w/v, HS  1.0 ml Lamb's Quarters 1:20 w/v, HS 1.0 ml Diluent: HSA qs to 5ml    Allergic rhinitis due to dust mite       azelastine 0.1 % spray    ASTELIN    1 Bottle    Spray 2 sprays into both nostrils 2 times daily    Rhinoconjunctivitis       cetirizine 10 MG tablet    zyrTEC    30 tablet    Take 1 tablet (10 mg) by mouth daily    Rhinoconjunctivitis       EPINEPHrine 0.3 MG/0.3ML injection 2-pack    EPIPEN/ADRENACLICK/or ANY BX GENERIC EQUIV    0.6 mL    Inject 0.3 mLs (0.3 mg) into the muscle as needed for anaphylaxis    Need for desensitization to allergens       fluticasone 50 MCG/ACT spray    FLONASE     Spray 2 sprays into both nostrils daily        SINGULAIR PO

## 2017-09-29 ENCOUNTER — ALLIED HEALTH/NURSE VISIT (OUTPATIENT)
Dept: ALLERGY | Facility: CLINIC | Age: 37
End: 2017-09-29
Payer: COMMERCIAL

## 2017-09-29 DIAGNOSIS — J30.9 ALLERGIC RHINITIS, UNSPECIFIED: Primary | ICD-10-CM

## 2017-09-29 PROCEDURE — 99207 ZZC NO CHARGE LOS: CPT

## 2017-09-29 PROCEDURE — 95117 IMMUNOTHERAPY INJECTIONS: CPT

## 2017-09-29 NOTE — MR AVS SNAPSHOT
After Visit Summary   9/29/2017    Roxanna Tao    MRN: [id number]           Patient Information     Date Of Birth          1980        Visit Information        Provider Department      9/29/2017 12:30 PM AN ALLERGY SHOTS [facility] [place]        Today's Diagnoses     Allergic rhinitis, unspecified    -  1       Follow-ups after your visit        Your next 10 appointments already scheduled     Oct 06, 2017  1:00 PM CDT   Nurse Only with AN ALLERGY SHOTS   [facility] [place] ([facility] [place])    [address]  [place] [address] [phone]            Oct 13, 2017  1:00 PM CDT   Nurse Only with AN ALLERGY SHOTS   [facility] [place] ([facility] [place])    [address]  [place] [address] [phone]            Oct 20, 2017  1:00 PM CDT   Nurse Only with AN ALLERGY SHOTS   [facility] [place] ([facility] [place])    [address]  [place] [address] [phone]            Oct 27, 2017  1:00 PM CDT   Nurse Only with AN ALLERGY SHOTS   [facility] [place] ([facility] [place])    [address]  [place] [address] [phone]              Who to contact     If you have questions or need follow up information about today's clinic visit or your schedule please contact [facility] directly at [phone].  Normal or non-critical lab and imaging results will be communicated to you by MyChart, letter or phone within 4 business days after the clinic has received the results. If you do not hear from us within 7 days, please contact the clinic through MyChart or phone. If you have a critical or abnormal lab result, we will notify you by phone as soon as possible.  Submit refill requests through [employer] or call your pharmacy and they will forward the refill request to us. Please allow 3 business days for your refill to be completed.          Additional Information About Your Visit       "  MyChart Information     [employer] lets you send messages to your doctor, view your test results, renew your prescriptions, schedule appointments and more. To sign up, go to [email or web address] . Click on \"Log in\" on the left side of the screen, which will take you to the Welcome page. Then click on \"Sign up Now\" on the right side of the page.     You will be asked to enter the access code listed below, as well as some personal information. Please follow the directions to create your username and password.     Your access code is: [email or web address]  Expires: 10/22/2017 11:35 AM     Your access code will  in 90 days. If you need help or a new code, please call your [facility] clinic or [phone].        Care EveryWhere ID     This is your Care EveryWhere ID. This could be used by other organizations to access your [place] medical records  [id number]         Blood Pressure from Last 3 Encounters:   17 120/83    Weight from Last 3 Encounters:   17 71.3 kg (157 lb 3.2 oz)              We Performed the Following     Allergy Shot: Two or more injections        Primary Care Provider    None Specified       No primary provider on file.        Equal Access to Services     [facility] : Hadii alice lopezo Celsa, waaxda lukirilladaha, qaybta kaalmada adesaulyada, viktor winters . So [facility] [phone].    ATENCIÓN: Si habla español, tiene a mc disposición servicios gratuitos de asistencia lingüística. Llame al [phone].    We comply with applicable federal civil rights laws and [place] laws. We do not discriminate on the basis of race, color, national origin, age, disability, sex, sexual orientation, or gender identity.            Thank you!     Thank you for choosing [facility]  for your care. Our goal is always to provide you with excellent care. Hearing back from our patients is one way we can continue to improve our services. Please take a few minutes to " complete the written survey that you may receive in the mail after your visit with us. Thank you!             Your Updated Medication List - Protect others around you: Learn how to safely use, store and throw away your medicines at www.disposemymeds.org.          This list is accurate as of: 9/29/17  1:08 PM.  Always use your most recent med list.                   Brand Name Dispense Instructions for use Diagnosis    ALLERGEN IMMUNOTHERAPY PRESCRIPTION     5 mL    Dust Mites DF 30,000AU/mL, HS  0.3 ml Dust Mites DP. 30,000 AU/mL, HS  0.3 ml  Jose, White 1:20 w/v, HS  1.0 ml Lamb's Quarters 1:20 w/v, HS 1.0 ml Diluent: HSA qs to 5ml    Allergic rhinitis due to dust mite       azelastine 0.1 % spray    ASTELIN    1 Bottle    Spray 2 sprays into both nostrils 2 times daily    Rhinoconjunctivitis       cetirizine 10 MG tablet    zyrTEC    30 tablet    Take 1 tablet (10 mg) by mouth daily    Rhinoconjunctivitis       EPINEPHrine 0.3 MG/0.3ML injection 2-pack    EPIPEN/ADRENACLICK/or ANY BX GENERIC EQUIV    0.6 mL    Inject 0.3 mLs (0.3 mg) into the muscle as needed for anaphylaxis    Need for desensitization to allergens       fluticasone 50 MCG/ACT spray    FLONASE     Spray 2 sprays into both nostrils daily        SINGULAIR PO

## 2017-10-13 ENCOUNTER — ALLIED HEALTH/NURSE VISIT (OUTPATIENT)
Dept: ALLERGY | Facility: CLINIC | Age: 37
End: 2017-10-13
Payer: COMMERCIAL

## 2017-10-13 DIAGNOSIS — J30.1 ALLERGIC RHINITIS DUE TO POLLEN: Primary | ICD-10-CM

## 2017-10-13 PROCEDURE — 99207 ZZC NO CHARGE LOS: CPT

## 2017-10-13 PROCEDURE — 95115 IMMUNOTHERAPY ONE INJECTION: CPT

## 2017-10-13 NOTE — MR AVS SNAPSHOT
"              After Visit Summary   10/13/2017    Roxanna Tao    MRN: [id number]           Patient Information     Date Of Birth          1980        Visit Information        Provider Department      10/13/2017 1:00 PM AN ALLERGY SHOTS [facility]        Today's Diagnoses     Allergic rhinitis due to pollen    -  1       Follow-ups after your visit        Your next 10 appointments already scheduled     Oct 20, 2017  1:00 PM CDT   Nurse Only with AN ALLERGY SHOTS   [facility] ([facility]    [address] [facility] [address] [phone]            Oct 27, 2017  1:00 PM CDT   Nurse Only with AN ALLERGY SHOTS   [facility] ([facility]    [address] [facility] [address] [phone]              Who to contact     If you have questions or need follow up information about today's clinic visit or your schedule please contact [facility] directly at [phone].  Normal or non-critical lab and imaging results will be communicated to you by Congohart, letter or phone within 4 business days after the clinic has received the results. If you do not hear from us within 7 days, please contact the clinic through [email or web address] or phone. If you have a critical or abnormal lab result, we will notify you by phone as soon as possible.  Submit refill requests through [employer] or call your pharmacy and they will forward the refill request to us. Please allow 3 business days for your refill to be completed.          Additional Information About Your Visit        CongoharMezmeriz Information     [employer] lets you send messages to your doctor, view your test results, renew your prescriptions, schedule appointments and more. To sign up, go to [email or web address] . Click on \"Log in\" on the left side of the screen, which will take you to the Welcome page. Then click on \"Sign up Now\" on the right side of the page.     You will be asked to " enter the access code listed below, as well as some personal information. Please follow the directions to create your username and password.     Your access code is: [email or web address]  Expires: 10/22/2017 11:35 AM     Your access code will  in 90 days. If you need help or a new code, please call your [facility] clinic or [phone].        Care EveryWhere ID     This is your Care EveryWhere ID. This could be used by other organizations to access your [facility] medical records  [id number]         Blood Pressure from Last 3 Encounters:   17 120/83    Weight from Last 3 Encounters:   17 71.3 kg (157 lb 3.2 oz)              We Performed the Following     Allergy Shot: One injection        Primary Care Provider    None Specified       No primary provider on file.        Equal Access to Services     [facility]: Hadii alice Steen, walukas blackadaha, qaybjennifer kaalmatrudi zepeda, viktor winters . So [facility] [phone].    ATENCIÓN: Si habla español, tiene a mc disposición servicios gratuitos de asistencia lingüística. Llame al [phone].    We comply with applicable federal civil rights laws and [place] laws. We do not discriminate on the basis of race, color, national origin, age, disability, sex, sexual orientation, or gender identity.            Thank you!     Thank you for choosing [facility]  [facility] your [facility]. Our goal is always to provide you with excellent care. Hearing back from our patients is one way we can continue to improve our services. Please take a few minutes to complete the written survey that you may receive in the mail after your visit with us. Thank you!             Your Updated Medication List - Protect others around you: Learn how to safely use, store and throw away your medicines at www.disposemymeds.org.          This list is accurate as of: 10/13/17  1:45 PM.  Always use your most recent med list.                   Brand Name Dispense  Instructions for use Diagnosis    ALLERGEN IMMUNOTHERAPY PRESCRIPTION     5 mL    Dust Mites DF 30,000AU/mL, HS  0.3 ml Dust Mites DP. 30,000 AU/mL, HS  0.3 ml  Jose, White 1:20 w/v, HS  1.0 ml Lamb's Quarters 1:20 w/v, HS 1.0 ml Diluent: HSA qs to 5ml    Allergic rhinitis due to dust mite       azelastine 0.1 % spray    ASTELIN    1 Bottle    Spray 2 sprays into both nostrils 2 times daily    Rhinoconjunctivitis       cetirizine 10 MG tablet    zyrTEC    30 tablet    Take 1 tablet (10 mg) by mouth daily    Rhinoconjunctivitis       EPINEPHrine 0.3 MG/0.3ML injection 2-pack    EPIPEN/ADRENACLICK/or ANY BX GENERIC EQUIV    0.6 mL    Inject 0.3 mLs (0.3 mg) into the muscle as needed for anaphylaxis    Need for desensitization to allergens       fluticasone 50 MCG/ACT spray    FLONASE     Spray 2 sprays into both nostrils daily        SINGULAIR PO

## 2017-10-13 NOTE — PROGRESS NOTES
Patient did not return after 30 minutes for assessment and was not found in lobby waiting area or lobby restrooms.     Vandana Norris RN ....... 10/13/2017 1:41 PM

## 2017-10-20 ENCOUNTER — ALLIED HEALTH/NURSE VISIT (OUTPATIENT)
Dept: ALLERGY | Facility: CLINIC | Age: 37
End: 2017-10-20
Payer: COMMERCIAL

## 2017-10-20 DIAGNOSIS — J30.1 CHRONIC SEASONAL ALLERGIC RHINITIS DUE TO POLLEN: Primary | ICD-10-CM

## 2017-10-20 PROCEDURE — 99207 ZZC NO CHARGE LOS: CPT

## 2017-10-20 PROCEDURE — 95115 IMMUNOTHERAPY ONE INJECTION: CPT

## 2017-10-20 NOTE — Clinical Note
See note from last Friday. Patient did not wait full 30 minutes previous week, was reminded again last week, but did not wait in lobby area again. Was rude to both January and me.

## 2017-10-20 NOTE — MR AVS SNAPSHOT
"              After Visit Summary   10/20/2017    Roxanna Tao    MRN: [id number]           Patient Information     Date Of Birth          1980        Visit Information        Provider Department      10/20/2017 1:00 PM AN ALLERGY SHOTS [facility]        Today's Diagnoses     Allergic rhinitis due to pollen    -  1       Follow-ups after your visit        Your next 10 appointments already scheduled     Oct 27, 2017  1:00 PM CDT   Nurse Only with AN ALLERGY SHOTS   [facility] ([facility])    [address] [facility] [address] [phone]              Who to contact     If you have questions or need follow up information about today's clinic visit or your schedule please contact [facility] directly at [phone].  Normal or non-critical lab and imaging results will be communicated to you by MyChart, letter or phone within 4 business days after the clinic has received the results. If you do not hear from us within 7 days, please contact the clinic through MyChart or phone. If you have a critical or abnormal lab result, we will notify you by phone as soon as possible.  Submit refill requests through Gly[employer]an or call your pharmacy and they will forward the refill request to us. Please allow 3 business days for your refill to be completed.          Additional Information About Your Visit        Nokterhart Information     Gly[employer] lets you send messages to your doctor, view your test results, renew your prescriptions, schedule appointments and more. To sign up, go to [email or web address] . Click on \"Log in\" on the left side of the screen, which will take you to the Welcome page. Then click on \"Sign up Now\" on the right side of the page.     You will be asked to enter the access code listed below, as well as some personal information. Please follow the directions to create your username and password.     Your access code is: [email or web address]  Expires: " 10/22/2017 11:35 AM     Your access code will  in 90 days. If you need help or a new code, please call your [facility] clinic or [phone].        Care EveryWhere ID     This is your Care EveryWhere ID. This could be used by other organizations to access your [facility] medical records  [id number]         Blood Pressure from Last 3 Encounters:   17 120/83    Weight from Last 3 Encounters:   17 71.3 kg (157 lb 3.2 oz)              We Performed the Following     Allergy Shot: One injection        Primary Care Provider Office Phone # Fax #    [facility] [phone] [address] [facility] [address]        Equal Access to Services     RICHARD GHOSH : Hadii aad ku hadasho Soomaali, waaxda luqadaha, qaybta kaalmada adeegyada, waxay idiin hayzafarn verito winters . So [facility] [phone].    ATENCIÓN: Si habla español, tiene a mc disposición servicios gratuitos de asistencia lingüística. [facility] [phone].    We comply with applicable federal civil rights laws and [place] laws. We do not discriminate on the basis of race, color, national origin, age, disability, sex, sexual orientation, or gender identity.            Thank you!     Thank you for choosing [facility]  for your care. Our goal is always to provide you with excellent care. Hearing back from our patients is one way we can continue to improve our services. Please take a few minutes to complete the written survey that you may receive in the mail after your visit with us. Thank you!             Your Updated Medication List - Protect others around you: Learn how to safely use, store and throw away your medicines at www.disposemymeds.org.          This list is accurate as of: 10/20/17  1:58 PM.  Always use your most recent med list.                   Brand Name Dispense Instructions for use Diagnosis    ALLERGEN IMMUNOTHERAPY PRESCRIPTION     5 mL    Dust Mites DF 30,000AU/mL, HS  0.3 ml  Dust Mites DP. 30,000 AU/mL, HS  0.3 ml  Jose, White 1:20 w/v, HS  1.0 ml Lamb's Quarters 1:20 w/v, HS 1.0 ml Diluent: HSA qs to 5ml    Allergic rhinitis due to dust mite       azelastine 0.1 % spray    ASTELIN    1 Bottle    Spray 2 sprays into both nostrils 2 times daily    Rhinoconjunctivitis       cetirizine 10 MG tablet    zyrTEC    30 tablet    Take 1 tablet (10 mg) by mouth daily    Rhinoconjunctivitis       EPINEPHrine 0.3 MG/0.3ML injection 2-pack    EPIPEN/ADRENACLICK/or ANY BX GENERIC EQUIV    0.6 mL    Inject 0.3 mLs (0.3 mg) into the muscle as needed for anaphylaxis    Need for desensitization to allergens       fluticasone 50 MCG/ACT spray    FLONASE     Spray 2 sprays into both nostrils daily        SINGULAIR PO

## 2017-10-25 NOTE — PROGRESS NOTES
If he leaves early again please schedule him in my clinic for a follow up and I will discuss that if continues this behavior shots will be discontinued. We will give him 1 more chance prior to having to have a sit down with me in clinic. Thanks.

## 2017-10-27 ENCOUNTER — ALLIED HEALTH/NURSE VISIT (OUTPATIENT)
Dept: ALLERGY | Facility: CLINIC | Age: 37
End: 2017-10-27
Payer: COMMERCIAL

## 2017-10-27 DIAGNOSIS — J30.1 ALLERGIC RHINITIS DUE TO POLLEN: Primary | ICD-10-CM

## 2017-10-27 PROCEDURE — 99207 ZZC NO CHARGE LOS: CPT

## 2017-10-27 PROCEDURE — 95115 IMMUNOTHERAPY ONE INJECTION: CPT

## 2017-10-27 NOTE — PROGRESS NOTES
"Patient was warned again today about needing to stay full 30 minutes in lobby area after injection. Patient did question the reasoning as he has \"not had a reaction\" and wondering why he can't stay in his car and return 30 minutes later.  Reviewed with patient that all allergy injection patients are monitored under staff supervision in the event that they have a serious reaction to allergy injection, mostly likely to occur in the time just after the injection.     Patient stated he was agreeable to following policy.  Allergy injection administered.    Patient presented after waiting 30 minutes with no reaction to allergy injections. Discharged from clinic.    Vandana Norris RN ....... 10/27/2017 1:43 PM     "

## 2017-10-27 NOTE — MR AVS SNAPSHOT
"              After Visit Summary   10/27/2017    Roxanna Tao    MRN: [id number]           Patient Information     Date Of Birth          1980        Visit Information        Provider Department      10/27/2017 1:00 PM AN ALLERGY SHOTS [facility]        Today's Diagnoses     Allergic rhinitis due to pollen    -  1       Follow-ups after your visit        Who to contact     If you have questions or need follow up information about today's clinic visit or your schedule please contact [facility] directly at [phone].  Normal or non-critical lab and imaging results will be communicated to you by MyChart, letter or phone within 4 business days after the clinic has received the results. If you do not hear from us within 7 days, please contact the clinic through DNA13hart or phone. If you have a critical or abnormal lab result, we will notify you by phone as soon as possible.  Submit refill requests through JoopLoop or call your pharmacy and they will forward the refill request to us. Please allow 3 business days for your refill to be completed.          Additional Information About Your Visit        MyChart Information     JoopLoop lets you send messages to your doctor, view your test results, renew your prescriptions, schedule appointments and more. To sign up, go to [email or web address] . Click on \"Log in\" on the left side of the screen, which will take you to the Welcome page. Then click on \"Sign up Now\" on the right side of the page.     You will be asked to enter the access code listed below, as well as some personal information. Please follow the directions to create your username and password.     Your access code is: [email or web address]  Expires: 2018  1:43 PM     Your access code will  in 90 days. If you need help or a new code, please call your [facility] or [phone].        Care EveryWhere ID     This is your Care EveryWhere ID. This could be used by other " organizations to access your [place] medical records  [id number]         Blood Pressure from Last 3 Encounters:   07/24/17 120/83    Weight from Last 3 Encounters:   07/24/17 71.3 kg (157 lb 3.2 oz)              We Performed the Following     Allergy Shot: One injection        Primary Care Provider Office Phone # Fax #    [facility] [phone] [address] [facility] [address]        Equal Access to Services     RICHARD GHOSH : Hadii aad ku hadasho Soomaali, waaxda luqadaha, qaybta kaalmada adeegyada, waxay idiin hayaan adeeg kharash la'aan . So [facility] [phone].    ATENCIÓN: Si habla español, tiene a mc disposición servicios gratuitos de asistencia lingüística. [facility] [phone].    We comply with applicable federal civil rights laws and [place] laws. We do not discriminate on the basis of race, color, national origin, age, disability, sex, sexual orientation, or gender identity.            Thank you!     Thank you for choosing [facility]  for your care. Our goal is always to provide you with excellent care. Hearing back from our patients is one way we can continue to improve our services. Please take a few minutes to complete the written survey that you may receive in the mail after your visit with us. Thank you!             Your Updated Medication List - Protect others around you: Learn how to safely use, store and throw away your medicines at www.disposemymeds.org.          This list is accurate as of: 10/27/17  1:43 PM.  Always use your most recent med list.                   Brand Name Dispense Instructions for use Diagnosis    ALLERGEN IMMUNOTHERAPY PRESCRIPTION     5 mL    Dust Mites DF 30,000AU/mL, HS  0.3 ml Dust Mites DP. 30,000 AU/mL, HS  0.3 ml  Jose, White 1:20 w/v, HS  1.0 ml Lamb's Quarters 1:20 w/v, HS 1.0 ml Diluent: HSA qs to 5ml    Allergic rhinitis due to dust mite       azelastine 0.1 % spray    ASTELIN    1 Bottle    Spray 2 sprays  into both nostrils 2 times daily    Rhinoconjunctivitis       cetirizine 10 MG tablet    zyrTEC    30 tablet    Take 1 tablet (10 mg) by mouth daily    Rhinoconjunctivitis       EPINEPHrine 0.3 MG/0.3ML injection 2-pack    EPIPEN/ADRENACLICK/or ANY BX GENERIC EQUIV    0.6 mL    Inject 0.3 mLs (0.3 mg) into the muscle as needed for anaphylaxis    Need for desensitization to allergens       fluticasone 50 MCG/ACT spray    FLONASE     Spray 2 sprays into both nostrils daily        SINGULAIR PO

## 2017-11-13 ENCOUNTER — ALLIED HEALTH/NURSE VISIT (OUTPATIENT)
Dept: ALLERGY | Facility: CLINIC | Age: 37
End: 2017-11-13
Payer: COMMERCIAL

## 2017-11-13 DIAGNOSIS — J30.1 CHRONIC SEASONAL ALLERGIC RHINITIS DUE TO POLLEN: Primary | ICD-10-CM

## 2017-11-13 DIAGNOSIS — J30.1 ALLERGIC RHINITIS DUE TO POLLEN: Primary | ICD-10-CM

## 2017-11-13 PROCEDURE — 99207 ZZC NO CHARGE LOS: CPT

## 2017-11-13 PROCEDURE — 95115 IMMUNOTHERAPY ONE INJECTION: CPT

## 2017-11-13 RX ORDER — FLUTICASONE PROPIONATE 50 MCG
2 SPRAY, SUSPENSION (ML) NASAL 2 TIMES DAILY
Qty: 1 BOTTLE | Refills: 0 | Status: SHIPPED | OUTPATIENT
Start: 2017-11-13 | End: 2018-04-09

## 2017-11-13 RX ORDER — MONTELUKAST SODIUM 10 MG/1
10 TABLET ORAL AT BEDTIME
Qty: 30 TABLET | Refills: 0 | Status: SHIPPED | OUTPATIENT
Start: 2017-11-13 | End: 2018-04-09

## 2017-11-13 NOTE — TELEPHONE ENCOUNTER
Patient in clinic for allergy injections today. Requesting refills on montelukast and flonase. RN unable to fill as these are listed as historical medications, and patient was due to be seen back in 6 weeks and has not returned. Routing to provider for approval of one time fill, will contact patient to schedule.   Daisy Elias RN ............   11/13/2017...5:58 PM

## 2017-11-13 NOTE — PROGRESS NOTES
Patient presented after waiting 30 minutes with no reaction to allergy injections. Discharged from clinic.  Daisy Elias RN ............   11/13/2017...5:53 PM

## 2017-11-13 NOTE — MR AVS SNAPSHOT
"              After Visit Summary   2017    Roxanna Tao    MRN: [id number]           Patient Information     Date Of Birth          1980        Visit Information        Provider Department      2017 5:30 PM AN ALLERGY SHOTS [facility]        Today's Diagnoses     Allergic rhinitis due to pollen    -  1       Follow-ups after your visit        Who to contact     If you have questions or need follow up information about today's clinic visit or your schedule please contact [facility] directly at [phone].  Normal or non-critical lab and imaging results will be communicated to you by MyChart, letter or phone within 4 business days after the clinic has received the results. If you do not hear from us within 7 days, please contact the clinic through Brilliant Telecommunicationshart or phone. If you have a critical or abnormal lab result, we will notify you by phone as soon as possible.  Submit refill requests through Semafone or call your pharmacy and they will forward the refill request to us. Please allow 3 business days for your refill to be completed.          Additional Information About Your Visit        MyChart Information     Semafone lets you send messages to your doctor, view your test results, renew your prescriptions, schedule appointments and more. To sign up, go to [email or web address] . Click on \"Log in\" on the left side of the screen, which will take you to the Welcome page. Then click on \"Sign up Now\" on the right side of the page.     You will be asked to enter the access code listed below, as well as some personal information. Please follow the directions to create your username and password.     Your access code is: [email or web address]  Expires: 2018 12:43 PM     Your access code will  in 90 days. If you need help or a new code, please call your [facility] or [phone].        Care EveryWhere ID     This is your Care EveryWhere ID. This could be used by other " organizations to access your [place] medical records  [id number]         Blood Pressure from Last 3 Encounters:   07/24/17 120/83    Weight from Last 3 Encounters:   07/24/17 71.3 kg (157 lb 3.2 oz)              We Performed the Following     Allergy Shot: One injection        Primary Care Provider Office Phone # Fax #    [facility] [phone] [address] [facility] [address]        Equal Access to Services     RICHARD GHOSH : Hadii aad ku hadasho Soomaali, waaxda luqadaha, qaybta kaalmada adeegyada, waxay idiin hayaan adeeg kharash la'aan . So [facility] [phone].    ATENCIÓN: Si habla español, tiene a mc disposición servicios gratuitos de asistencia lingüística. [facility] [phone].    We comply with applicable federal civil rights laws and [place] laws. We do not discriminate on the basis of race, color, national origin, age, disability, sex, sexual orientation, or gender identity.            Thank you!     Thank you for choosing [facility]  for your care. Our goal is always to provide you with excellent care. Hearing back from our patients is one way we can continue to improve our services. Please take a few minutes to complete the written survey that you may receive in the mail after your visit with us. Thank you!             Your Updated Medication List - Protect others around you: Learn how to safely use, store and throw away your medicines at www.disposemymeds.org.          This list is accurate as of: 11/13/17  5:53 PM.  Always use your most recent med list.                   Brand Name Dispense Instructions for use Diagnosis    ALLERGEN IMMUNOTHERAPY PRESCRIPTION     5 mL    Dust Mites DF 30,000AU/mL, HS  0.3 ml Dust Mites DP. 30,000 AU/mL, HS  0.3 ml  Jose, White 1:20 w/v, HS  1.0 ml Lamb's Quarters 1:20 w/v, HS 1.0 ml Diluent: HSA qs to 5ml    Allergic rhinitis due to dust mite       azelastine 0.1 % spray    ASTELIN    1 Bottle    Spray 2 sprays  into both nostrils 2 times daily    Rhinoconjunctivitis       cetirizine 10 MG tablet    zyrTEC    30 tablet    Take 1 tablet (10 mg) by mouth daily    Rhinoconjunctivitis       EPINEPHrine 0.3 MG/0.3ML injection 2-pack    EPIPEN/ADRENACLICK/or ANY BX GENERIC EQUIV    0.6 mL    Inject 0.3 mLs (0.3 mg) into the muscle as needed for anaphylaxis    Need for desensitization to allergens       fluticasone 50 MCG/ACT spray    FLONASE     Spray 2 sprays into both nostrils daily        SINGULAIR PO

## 2017-11-27 ENCOUNTER — ALLIED HEALTH/NURSE VISIT (OUTPATIENT)
Dept: ALLERGY | Facility: CLINIC | Age: 37
End: 2017-11-27
Payer: COMMERCIAL

## 2017-11-27 DIAGNOSIS — J30.1 ALLERGIC RHINITIS DUE TO POLLEN: Primary | ICD-10-CM

## 2017-11-27 PROCEDURE — 95115 IMMUNOTHERAPY ONE INJECTION: CPT

## 2017-11-27 PROCEDURE — 99207 ZZC NO CHARGE LOS: CPT

## 2017-11-28 NOTE — PROGRESS NOTES
Patient presented after waiting 30 minutes with no reaction to allergy injections. Discharged from clinic.  Daisy Elias RN ............   11/27/2017...6:03 PM

## 2017-12-04 ENCOUNTER — ALLIED HEALTH/NURSE VISIT (OUTPATIENT)
Dept: ALLERGY | Facility: CLINIC | Age: 37
End: 2017-12-04
Payer: COMMERCIAL

## 2017-12-04 DIAGNOSIS — J30.1 ALLERGIC RHINITIS DUE TO POLLEN: Primary | ICD-10-CM

## 2017-12-04 PROCEDURE — 99207 ZZC NO CHARGE LOS: CPT

## 2017-12-04 PROCEDURE — 95115 IMMUNOTHERAPY ONE INJECTION: CPT

## 2017-12-04 NOTE — MR AVS SNAPSHOT
After Visit Summary   12/4/2017    Roxanna Tao    MRN: [id number]           Patient Information     Date Of Birth          1980        Visit Information        Provider Department      12/4/2017 5:45 PM AN ALLERGY SHOTS [facility] [place]        Today's Diagnoses     Allergic rhinitis due to pollen    -  1       Follow-ups after your visit        Your next 10 appointments already scheduled     Jan 03, 2018  5:00 PM CST   Nurse Only with AN ALLERGY SHOTS   [facility] [place] ([facility] [place]    [address]  [place] [address]   [phone]            Yusuf 10, 2018  5:00 PM CST   Nurse Only with AN ALLERGY SHOTS   [facility] [place] ([facility] [place])    [address]  [place] [address]   [phone]            Yusuf 15, 2018  5:00 PM CST   Nurse Only with AN ALLERGY SHOTS   [facility] [place] ([facility] [place])    [address]  [place] [address]   [phone]            Jan 22, 2018  5:00 PM CST   Nurse Only with AN ALLERGY SHOTS   [facility] [place] ([facility] [place])    [address]  [place] [address]   [phone]            Jan 29, 2018  5:00 PM CST   Nurse Only with AN ALLERGY SHOTS   [facility] [place] ([facility] [place])    [address]  [place] [address]   [phone]            Feb 05, 2018  5:20 PM CST   Nurse Only with AN ALLERGY SHOTS   [facility] [place] ([facility] [place])    [address]  [place] [address]   [phone]            Feb 19, 2018  5:00 PM CST   Nurse Only with AN ALLERGY SHOTS   [facility] [place] ([facility] [place])    [address]  [place] [address]   [phone]            Feb 26, 2018  5:00 PM CST   Nurse Only with AN ALLERGY SHOTS   [facility] [place] ([facility] [place])    [address]  [place] [address]   [phone]              Who to contact      "If you have questions or need follow up information about today's clinic visit or your schedule please contact [facility] directly at [phone].  Normal or non-critical lab and imaging results will be communicated to you by MyChart, letter or phone within 4 business days after the clinic has received the results. If you do not hear from us within 7 days, please contact the clinic through MyChart or phone. If you have a critical or abnormal lab result, we will notify you by phone as soon as possible.  Submit refill requests through [employer] or call your pharmacy and they will forward the refill request to us. Please allow 3 business days for your refill to be completed.          Additional Information About Your Visit        5k FansharAccruit Information     [employer] lets you send messages to your doctor, view your test results, renew your prescriptions, schedule appointments and more. To sign up, go to [email or web address] . Click on \"Log in\" on the left side of the screen, which will take you to the Welcome page. Then click on \"Sign up Now\" on the right side of the page.     You will be asked to enter the access code listed below, as well as some personal information. Please follow the directions to create your username and password.     Your access code is: [email or web address]  Expires: 2018 12:43 PM     Your access code will  in 90 days. If you need help or a new code, please call your [facility] or [phone].        Care EveryWhere ID     This is your Care EveryWhere ID. This could be used by other organizations to access your [place] medical records  [id number]         Blood Pressure from Last 3 Encounters:   17 120/83    Weight from Last 3 Encounters:   17 71.3 kg (157 lb 3.2 oz)              We Performed the Following     Allergy Shot: One injection        Primary Care Provider Office Phone # Fax #    [facility] [phone] [address] [address] " [facility] [address]        Equal Access to Services     [facility]ЛИЯ : Hadii alice ku hadluluo Sosurinderali, waaxda luqadaha, qaybta kaalmada alexsandrasmay, waxfede craine monahansandralawrence bennett. So [facility] [phone].    ATENCIÓN: Si habla español, tiene a mc disposición servicios gratuitos de asistencia lingüística. [facility] [phone].    We comply with applicable federal civil rights laws and [place] laws. We do not discriminate on the basis of race, color, national origin, age, disability, sex, sexual orientation, or gender identity.            Thank you!     Thank you for choosing [facility]  for your care. Our goal is always to provide you with excellent care. Hearing back from our patients is one way we can continue to improve our services. Please take a few minutes to complete the written survey that you may receive in the mail after your visit with us. Thank you!             Your Updated Medication List - Protect others around you: Learn how to safely use, store and throw away your medicines at www.disposemymeds.org.          This list is accurate as of: 12/4/17  6:20 PM.  Always use your most recent med list.                   Brand Name Dispense Instructions for use Diagnosis    ALLERGEN IMMUNOTHERAPY PRESCRIPTION     5 mL    Dust Mites DF 30,000AU/mL, HS  0.3 ml Dust Mites DP. 30,000 AU/mL, HS  0.3 ml  Lulu, White 1:20 w/v, HS  1.0 ml Lamb's Quarters 1:20 w/v, HS 1.0 ml Diluent: HSA qs to 5ml    Allergic rhinitis due to dust mite       azelastine 0.1 % spray    ASTELIN    1 Bottle    Spray 2 sprays into both nostrils 2 times daily    Rhinoconjunctivitis       cetirizine 10 MG tablet    zyrTEC    30 tablet    Take 1 tablet (10 mg) by mouth daily    Rhinoconjunctivitis       EPINEPHrine 0.3 MG/0.3ML injection 2-pack    EPIPEN/ADRENACLICK/or ANY BX GENERIC EQUIV    0.6 mL    Inject 0.3 mLs (0.3 mg) into the muscle as needed for anaphylaxis    Need for desensitization to allergens       *  fluticasone 50 MCG/ACT spray    FLONASE     Spray 2 sprays into both nostrils daily        * fluticasone 50 MCG/ACT spray    FLONASE    1 Bottle    Spray 2 sprays into both nostrils 2 times daily Office visit for further refills    Chronic seasonal allergic rhinitis due to pollen       * SINGULAIR PO           * montelukast 10 MG tablet    SINGULAIR    30 tablet    Take 1 tablet (10 mg) by mouth At Bedtime Office visit for further refills.    Chronic seasonal allergic rhinitis due to pollen       * Notice:  This list has 4 medication(s) that are the same as other medications prescribed for you. Read the directions carefully, and ask your doctor or other care provider to review them with you.

## 2017-12-05 NOTE — PROGRESS NOTES
Patient presented after waiting 30 minutes with no reaction to allergy injections. Discharged from clinic.  Daisy Elias RN ............   12/4/2017...6:19 PM

## 2018-01-02 ENCOUNTER — TELEPHONE (OUTPATIENT)
Dept: ALLERGY | Facility: CLINIC | Age: 38
End: 2018-01-02

## 2018-01-02 NOTE — TELEPHONE ENCOUNTER
Patient has allergy injection appointment scheduled for tomorrow 1/3/18 and will be late during building (30 days since last injection).      Last dose 12/4/17:   T;W;Dm 0.2 ml Blue vial     Patient does not have history of systemic or large local reactions. Routing to provider for advisement on dosing and timeframe orders are valid for.     Daisy Elias RN ............   1/2/2018...4:40 PM

## 2018-04-09 ENCOUNTER — OFFICE VISIT (OUTPATIENT)
Dept: ALLERGY | Facility: CLINIC | Age: 38
End: 2018-04-09
Payer: COMMERCIAL

## 2018-04-09 VITALS
HEIGHT: 66 IN | TEMPERATURE: 97.7 F | HEART RATE: 78 BPM | RESPIRATION RATE: 16 BRPM | DIASTOLIC BLOOD PRESSURE: 86 MMHG | WEIGHT: 162 LBS | SYSTOLIC BLOOD PRESSURE: 124 MMHG | OXYGEN SATURATION: 100 % | BODY MASS INDEX: 26.03 KG/M2

## 2018-04-09 DIAGNOSIS — J30.89 ALLERGIC RHINITIS DUE TO DUST MITE: ICD-10-CM

## 2018-04-09 DIAGNOSIS — J30.1 CHRONIC SEASONAL ALLERGIC RHINITIS DUE TO POLLEN: ICD-10-CM

## 2018-04-09 PROCEDURE — 99213 OFFICE O/P EST LOW 20 MIN: CPT | Performed by: ALLERGY & IMMUNOLOGY

## 2018-04-09 RX ORDER — MONTELUKAST SODIUM 10 MG/1
10 TABLET ORAL AT BEDTIME
Qty: 30 TABLET | Refills: 0 | Status: SHIPPED | OUTPATIENT
Start: 2018-04-09 | End: 2018-06-04

## 2018-04-09 RX ORDER — AZELASTINE 1 MG/ML
2 SPRAY, METERED NASAL 2 TIMES DAILY
Qty: 1 BOTTLE | Refills: 3 | Status: SHIPPED | OUTPATIENT
Start: 2018-04-09 | End: 2019-05-31

## 2018-04-09 RX ORDER — CETIRIZINE HYDROCHLORIDE 10 MG/1
10 TABLET ORAL DAILY
Qty: 30 TABLET | Refills: 3 | Status: SHIPPED | OUTPATIENT
Start: 2018-04-09 | End: 2018-06-04

## 2018-04-09 RX ORDER — FLUTICASONE PROPIONATE 50 MCG
2 SPRAY, SUSPENSION (ML) NASAL 2 TIMES DAILY
Qty: 1 BOTTLE | Refills: 0 | Status: SHIPPED | OUTPATIENT
Start: 2018-04-09 | End: 2018-09-12

## 2018-04-09 NOTE — MR AVS SNAPSHOT
After Visit Summary   4/9/2018    Roxanna Tao    MRN: [id number]           Patient Information     Date Of Birth          1980        Visit Information        Provider Department      4/9/2018 5:40 PM Ray Mcleod DO [facility]        Today's Diagnoses     Chronic seasonal allergic rhinitis due to pollen        Allergic rhinitis due to dust mite          Care Instructions    Allergy Staff Appt Hours Shot Hours Locations    Physician     Ray Mcleod DO       Support Staff     Aparna WESTFALL RN      Fariba WESTFALL, [facility]  Monday:                      [place] 8-7     Tuesday:         [place] 8-5     Wednesday:        [place]: 7-5     Friday:        [place] 7-5   [place]        Monday: 9-5:50        Wednesday: 2-5:50        Friday: 7-12:50     [place]        Tuesday: 7-10:50        Thursday: 1:30-6:30     [place] Monday: 7:10-4:50        Tuesday: 12:30-6:30        Thursday: 7-11:50 [facility]  [address] [place] [address]  Appt Line: [phone]  Allergy RN (Monday):  [phone]    [facility]  [address] [place] [address]  Appt Line: [phone]  Allergy RN (Tues & Wed):  [phone]    [facility]  [address] [place] [address]  Appt Line: [phone]  Allergy RN (Friday):  [phone]       Important Scheduling Information  Aspirin Desensitization: Appt will last 2 clinic days. Please call the Allergy RN line for your clinic to schedule. Discontinue antihistamines 7 days prior to the appointment.     Food Challenges: Appt will last 3-4 hours. Please call the Allergy RN line for your clinic to schedule. Discontinue antihistamines 7 days prior to the appointment.     Penicillin Testing: Appt will last 2-3 hours. Please call the Allergy RN line for your clinic to schedule. Discontinue antihistamines 7 days prior to the appointment.     Skin Testing: Appt will about 40 minutes. Call the appointment line for your  clinic to schedule. Discontinue antihistamines 7 days prior to the appointment.     Venom Testing: Appt will last 2-3 hours. Please call the Allergy RN line for your clinic to schedule. Discontinue antihistamines 7 days prior to the appointment.     Thank you for trusting us with your Allergy, Asthma, and Immunology care. Please feel free to contact us with any questions or concerns you may have.      - Flonase 50mcg 2 sprays/nostril q day. Use consistently.   - Azelastine 2 sprays/nostril twice daily as needed.   - Cetirizine (Zyrtec) 10mg by mouth daily.     ACCELERATED IMMUNOTHERAPY PATIENT INFORMATION    Immunotherapy is a treatment that alters the patient s immune system so they have less allergy symptoms, use less medications to control symptoms, improved quality of life, and less health care utilization    Accelerated immunotherapy schedules are designed to allow patients to reach their maintenance immunotherapy dose in a shorter time frame than conventional immunotherapy.    Clinical benefit can be reached more rapidly using accelerated immunotherapy.     A lot of patients do not want to start allergen immunotherapy secondary to the upfront time commitment associated with conventional allergy shots. Rush immunotherapy would allow a patient to be on monthly allergy shots within 6-10 weeks. Cluster immunotherapy would allow the patient to be on monthly injections around 8-9 weeks.     Rush immunotherapy has historically been associated with an increased risk of reactions, however the risk is substantially lowered by only advancing on RUSH immunotherapy day to the mid-yellow vial, using a pre-medication regimen consisting of steroids and antihistamines, and making sure asthma is well controlled the RUSH immunotherapy day. This puts the risk of a systemic reaction similar to conventional immunotherapy. Cluster immunotherapy is similar in the risk of systemic reaction to conventional immunotherapy. The patient  would still need to take oral antihistamine on cluster immunotherapy days.    CLUSTER IMMUNOTHERAPY PATIENT INSTRUCTIONS    Asthma medications must be continued and asthma must be well controlled prior to receiving CLUSTER immunotherapy. Immunotherapy should not be given if you are feeling ill.    Other allergy medications may be continued    You should plan to spend approximately 2 hours at the clinic. Please feel free to bring things to occupy your time such as books, work, or computers. You may also wish to bring something to eat as you will not be able to leave the clinic once the procedure has begun.    You will be required to bring an epinephrine auto-injector with you to your CLUSTER immunotherapy appointments and all subsequent allergy shot appointments    You will be required to take the following pre-medication regimen prior  to your CLUSTER immunotherapy appointments  o Medications to be taken 1 day prior to CLUSTER:  - Zyrtec 10mg or Allegra 180mg twice daily  o Medications to be taken the morning of CLUSTER:  - Zyrtec 10mg or Allegra 180mg    Allergy Immunotherapy CPT Codes     Rapid Immunotherapy - 17488  o Billed hourly  - Rush Immunotherapy (approximately 8 hours)  - Cluster Immunotherapy (approximately 10 hours)      Traditional Immunotherapy - 19635  o Also used after Rapid Immunotherapy is completed      Serums - 26486  o Billed per milliliter  - 1 serum = 20ml  - 2 serums = 40ml  - 3 serums = 60ml  - 4 serums = 80ml      [facility] Phone: [phone]              Follow-ups after your visit        Who to contact     If you have questions or need follow up information about today's clinic visit or your schedule please contact [facility] directly at [phone].  Normal or non-critical lab and imaging results will be communicated to you by MyChart, letter or phone within 4 business days after the clinic has received the results. If you do not hear from us within 7 days,  "please contact the clinic through 66. com or phone. If you have a critical or abnormal lab result, we will notify you by phone as soon as possible.  Submit refill requests through 66. com or call your pharmacy and they will forward the refill request to us. Please allow 3 business days for your refill to be completed.          Additional Information About Your Visit        [employer] Information     66. com lets you send messages to your doctor, view your test results, renew your prescriptions, schedule appointments and more. To sign up, go to [email or web address] . Click on \"Log in\" on the left side of the screen, which will take you to the Welcome page. Then click on \"Sign up Now\" on the right side of the page.     You will be asked to enter the access code listed below, as well as some personal information. Please follow the directions to create your username and password.     Your access code is: [email or web address]  Expires: 2018  6:19 PM     Your access code will  in 90 days. If you need help or a new code, please call your [place] clinic or [phone].        Care EveryWhere ID     This is your Care EveryWhere ID. This could be used by other organizations to access your [place] medical records  [id number]        Your Vitals Were     Pulse Temperature Respirations Height Pulse Oximetry BMI (Body Mass Index)    78 97.7  F (36.5  C) (Oral) 16 1.676 m (5' 6\") 100% 26.15 kg/m2       Blood Pressure from Last 3 Encounters:   18 124/86   17 120/83    Weight from Last 3 Encounters:   18 73.5 kg (162 lb)   17 71.3 kg (157 lb 3.2 oz)              Today, you had the following     No orders found for display         Where to get your medicines      These medications were sent to [id number] - [place]   [address]   [place] [address]    Hours:  test fax sent successfully 03 kr Phone:  [phone]     azelastine 0.1 % spray    " cetirizine 10 MG tablet    fluticasone 50 MCG/ACT spray    montelukast 10 MG tablet          Primary Care Provider Office Phone # Fax #    [facility] [phone] [address] [facility] [address]        Equal Access to Services     RICHARD GHOSH : Hadii aad ku hadasho Soomaali, waaxda luqadaha, qaybta kaalmada adeegyada, waxay idiin hayaan adesaul fan larolando bennett. So [facility] [phone].    ATENCIÓN: Si habla español, tiene a mc disposición servicios gratuitos de asistencia lingüística. Laura al [phone].    We comply with applicable federal civil rights laws and [place] laws. We do not discriminate on the basis of race, color, national origin, age, disability, sex, sexual orientation, or gender identity.            Thank you!     Thank you for choosing [facility]  for your care. Our goal is always to provide you with excellent care. Hearing back from our patients is one way we can continue to improve our services. Please take a few minutes to complete the written survey that you may receive in the mail after your visit with us. Thank you!             Your Updated Medication List - Protect others around you: Learn how to safely use, store and throw away your medicines at www.disposemymeds.org.          This list is accurate as of 4/9/18  6:10 PM.  Always use your most recent med list.                   Brand Name Dispense Instructions for use Diagnosis    ALLERGEN IMMUNOTHERAPY PRESCRIPTION     5 mL    Dust Mites DF 30,000AU/mL, HS  0.3 ml Dust Mites DP. 30,000 AU/mL, HS  0.3 ml  Jose, White 1:20 w/v, HS  1.0 ml Lamb's Quarters 1:20 w/v, HS 1.0 ml Diluent: HSA qs to 5ml    Allergic rhinitis due to dust mite       azelastine 0.1 % spray    ASTELIN    1 Bottle    Spray 2 sprays into both nostrils 2 times daily    Chronic seasonal allergic rhinitis due to pollen       cetirizine 10 MG tablet    zyrTEC    30 tablet    Take 1 tablet (10 mg) by mouth daily    Chronic  seasonal allergic rhinitis due to pollen       EPINEPHrine 0.3 MG/0.3ML injection 2-pack    EPIPEN/ADRENACLICK/or ANY BX GENERIC EQUIV    0.6 mL    Inject 0.3 mLs (0.3 mg) into the muscle as needed for anaphylaxis    Need for desensitization to allergens       * fluticasone 50 MCG/ACT spray    FLONASE     Spray 2 sprays into both nostrils daily        * fluticasone 50 MCG/ACT spray    FLONASE    1 Bottle    Spray 2 sprays into both nostrils 2 times daily Office visit for further refills    Chronic seasonal allergic rhinitis due to pollen       * SINGULAIR PO           * montelukast 10 MG tablet    SINGULAIR    30 tablet    Take 1 tablet (10 mg) by mouth At Bedtime Office visit for further refills.    Chronic seasonal allergic rhinitis due to pollen       * Notice:  This list has 4 medication(s) that are the same as other medications prescribed for you. Read the directions carefully, and ask your doctor or other care provider to review them with you.

## 2018-04-09 NOTE — PATIENT INSTRUCTIONS
Allergy Staff Appt Hours Shot Hours Locations    Physician     Ray Mcleod, DO       Support Staff     Aparna WESTFALL RN      Fariba WESTFALL, [facility]  Monday:                      [place] 8-7     Tuesday:         [place] 8-5     Wednesday:        [place]: 7-5     Friday:        [place] 7-5   [place]        Monday: 9-5:50        Wednesday: 2-5:50        Friday: 7-12:50     [place]        Tuesday: 7-10:50        Thursday: 1:30-6:30     [place] Monday: 7:10-4:50        Tuesday: 12:30-6:30        Thursday: 7-11:50 [facility]  [address] [place] [address]  Appt Line: [phone]  Allergy RN (Monday):  [phone]    [facility]  [address] [place] [address]  Appt Line: [phone]  Allergy RN (Tues & Wed):  [phone]    [facility]  [address] [place] [address]  Appt Line: [phone]  Allergy RN (Friday):  [phone]       Important Scheduling Information  Aspirin Desensitization: Appt will last 2 clinic days. Please call the Allergy RN line for your clinic to schedule. Discontinue antihistamines 7 days prior to the appointment.     Food Challenges: Appt will last 3-4 hours. Please call the Allergy RN line for your clinic to schedule. Discontinue antihistamines 7 days prior to the appointment.     Penicillin Testing: Appt will last 2-3 hours. Please call the Allergy RN line for your clinic to schedule. Discontinue antihistamines 7 days prior to the appointment.     Skin Testing: Appt will about 40 minutes. Call the appointment line for your clinic to schedule. Discontinue antihistamines 7 days prior to the appointment.     Venom Testing: Appt will last 2-3 hours. Please call the Allergy RN line for your clinic to schedule. Discontinue antihistamines 7 days prior to the appointment.     Thank you for trusting us with your Allergy, Asthma, and Immunology care. Please feel free to contact us with any questions or concerns you may have.      - Flonase 50mcg 2  sprays/nostril q day. Use consistently.   - Azelastine 2 sprays/nostril twice daily as needed.   - Cetirizine (Zyrtec) 10mg by mouth daily.     ACCELERATED IMMUNOTHERAPY PATIENT INFORMATION    Immunotherapy is a treatment that alters the patient s immune system so they have less allergy symptoms, use less medications to control symptoms, improved quality of life, and less health care utilization    Accelerated immunotherapy schedules are designed to allow patients to reach their maintenance immunotherapy dose in a shorter time frame than conventional immunotherapy.    Clinical benefit can be reached more rapidly using accelerated immunotherapy.     A lot of patients do not want to start allergen immunotherapy secondary to the upfront time commitment associated with conventional allergy shots. Rush immunotherapy would allow a patient to be on monthly allergy shots within 6-10 weeks. Cluster immunotherapy would allow the patient to be on monthly injections around 8-9 weeks.     Rush immunotherapy has historically been associated with an increased risk of reactions, however the risk is substantially lowered by only advancing on RUSH immunotherapy day to the mid-yellow vial, using a pre-medication regimen consisting of steroids and antihistamines, and making sure asthma is well controlled the RUSH immunotherapy day. This puts the risk of a systemic reaction similar to conventional immunotherapy. Cluster immunotherapy is similar in the risk of systemic reaction to conventional immunotherapy. The patient would still need to take oral antihistamine on cluster immunotherapy days.    CLUSTER IMMUNOTHERAPY PATIENT INSTRUCTIONS    Asthma medications must be continued and asthma must be well controlled prior to receiving CLUSTER immunotherapy. Immunotherapy should not be given if you are feeling ill.    Other allergy medications may be continued    You should plan to spend approximately 2 hours at the clinic. Please feel free  to bring things to occupy your time such as books, work, or computers. You may also wish to bring something to eat as you will not be able to leave the clinic once the procedure has begun.    You will be required to bring an epinephrine auto-injector with you to your CLUSTER immunotherapy appointments and all subsequent allergy shot appointments    You will be required to take the following pre-medication regimen prior  to your CLUSTER immunotherapy appointments  o Medications to be taken 1 day prior to CLUSTER:  - Zyrtec 10mg or Allegra 180mg twice daily  o Medications to be taken the morning of CLUSTER:  - Zyrtec 10mg or Allegra 180mg    Allergy Immunotherapy CPT Codes     Rapid Immunotherapy - 64883  o Billed hourly  - Rush Immunotherapy (approximately 8 hours)  - Cluster Immunotherapy (approximately 10 hours)      Traditional Immunotherapy - 40075  o Also used after Rapid Immunotherapy is completed      Serums - 55308  o Billed per milliliter  - 1 serum = 20ml  - 2 serums = 40ml  - 3 serums = 60ml  - 4 serums = 80ml      [facility] Office Phone: [phone]

## 2018-04-09 NOTE — PROGRESS NOTES
Roxanna Tao is a 38 year old  male with previous medical history significant for allergic rhinitis who returns for a follow up visit. Roxanna Tao is being seen today for seasonal allergies.     The patient returns for follow-up visit.  He has been having rhinorrhea, sneezing, congestion.  He is off all allergy medications including Singulair, Flonase, Astelin and Zyrtec.  He had been started on allergen immunotherapy for dust mites, weeds and trees.  Unfortunately, the patient has been unable to build via allergen immunotherapy.  He is interested in switching to cluster immunotherapy.  He had tolerated allergen immunotherapy.    History reviewed. No pertinent past medical history.  History reviewed. No pertinent family history.  History reviewed. No pertinent surgical history.    REVIEW OF SYSTEMS:  General: negative for weight gain. negative for weight loss. negative for changes in sleep.   Ears: negative for fullness. negative for hearing loss. negative for dizziness.   Nose: negative for snoring.negative for changes in smell. negative for drainage.   Throat: negative for hoarseness. negative for sore throat. negative for trouble swallowing.   Lungs: negative for shortness of breath.negative for wheezing. negative for sputum production.   Cardiovascular: negative for chest pain. negative for swelling of ankles. negative for fast or irregular heartbeat.   Gastrointestinal: negative for nausea. negative for heartburn. negative for acid reflux.   Musculoskeletal: negative for joint pain. negative for joint stiffness. negative for joint swelling.   Neurologic: negative for seizures. negative for fainting. negative for weakness.   Psychiatric: negative for changes in mood. negative for anxiety.   Endocrine: negative for cold intolerance. negative for heat intolerance. negative for tremors.   Hematologic: negative for easy bruising. negative for easy bleeding.  Integumentary: negative for rash. negative  for scaling. negative for nail changes.       Current Outpatient Prescriptions:      montelukast (SINGULAIR) 10 MG tablet, Take 1 tablet (10 mg) by mouth At Bedtime Office visit for further refills., Disp: 30 tablet, Rfl: 0     fluticasone (FLONASE) 50 MCG/ACT spray, Spray 2 sprays into both nostrils 2 times daily Office visit for further refills, Disp: 1 Bottle, Rfl: 0     cetirizine (ZYRTEC) 10 MG tablet, Take 1 tablet (10 mg) by mouth daily, Disp: 30 tablet, Rfl: 3     azelastine (ASTELIN) 0.1 % spray, Spray 2 sprays into both nostrils 2 times daily, Disp: 1 Bottle, Rfl: 3     EPINEPHrine (EPIPEN/ADRENACLICK/OR ANY BX GENERIC EQUIV) 0.3 MG/0.3ML injection 2-pack, Inject 0.3 mLs (0.3 mg) into the muscle as needed for anaphylaxis, Disp: 0.6 mL, Rfl: 0     [DISCONTINUED] montelukast (SINGULAIR) 10 MG tablet, Take 1 tablet (10 mg) by mouth At Bedtime Office visit for further refills. (Patient not taking: Reported on 4/9/2018), Disp: 30 tablet, Rfl: 0     ORDER FOR ALLERGEN IMMUNOTHERAPY, Dust Mites DF 30,000AU/mL, HS  0.3 ml Dust Mites DP. 30,000 AU/mL, HS  0.3 ml  Jose, White 1:20 w/v, HS  1.0 ml Lamb's Quarters 1:20 w/v, HS 1.0 ml Diluent: HSA qs to 5ml (Patient not taking: Reported on 4/9/2018), Disp: 5 mL, Rfl: prn     fluticasone (FLONASE) 50 MCG/ACT spray, Spray 2 sprays into both nostrils daily, Disp: , Rfl:      Montelukast Sodium (SINGULAIR PO), , Disp: , Rfl:     There is no immunization history on file for this patient.  No Known Allergies      EXAM:   Constitutional:  Appears well-developed and well-nourished. No distress.   HEENT:   Head: Normocephalic.   Mouth/Throat: No oropharyngeal exudate present.   Cobblestoning of posterior oropharynx.   Boggy nasal tissue and pale.    Eyes: Conjunctivae are non-erythematous   Cardiovascular: Normal rate, regular rhythm and normal heart sounds. Exam reveals no gallop and no friction rub.   No murmur heard.  Respiratory: Effort normal and breath sounds normal. No  respiratory distress. No wheezes. No rales.   Musculoskeletal: Normal range of motion.   Lymphadenopathy:   No cervical adenopathy.   No lower extremity edema.   Neuro: Oriented to person, place, and time.  Skin: Skin is warm and dry. No rash noted.   Psychiatric: Normal mood and affect.     Nursing note and vitals reviewed.    ASSESSMENT/PLAN:  Problem List Items Addressed This Visit        Respiratory    Allergic rhinitis due to dust mite     Perennial nasal and ocular symptoms for the last 11 years. Flonase has been used and non-beneficial. Zyrtec, montelukast and other oral antihistamines have been non-beneficial. Using refresh eyedrops. Allergy testing 7-8 years ago positive for at least dust mites. Using that a netti pot and this has been beneficial.      - Flonase 50mcg 2 sprays/nostril q day. Use consistently.   - Azelastine 2 sprays/nostril twice daily as needed.   - Cetirizine (Zyrtec) 10mg by mouth daily.   - On allergen immunotherapy and tolerating.  However, difficulty building with allergen immunotherapy. Switching to cluster immunotherapy.          Relevant Medications    montelukast (SINGULAIR) 10 MG tablet    fluticasone (FLONASE) 50 MCG/ACT spray    cetirizine (ZYRTEC) 10 MG tablet    azelastine (ASTELIN) 0.1 % spray    Chronic seasonal allergic rhinitis due to pollen    Relevant Medications    montelukast (SINGULAIR) 10 MG tablet    fluticasone (FLONASE) 50 MCG/ACT spray    cetirizine (ZYRTEC) 10 MG tablet    azelastine (ASTELIN) 0.1 % spray          Chart documentation with Dragon Voice recognition Software. Although reviewed after completion, some words and grammatical errors may remain.    Ray Mcleod,    Allergy/Immunology  [facility], [place] and [place], [place]

## 2018-04-09 NOTE — LETTER
4/9/2018         RE: Roxanna Tao  [address] [facility] [address]        Dear Colleague,    Thank you for referring your patient, Roxanna Tao, to the [facility]. Please see a copy of my visit note below.    Roxanna Tao is a 38 year old  male with previous medical history significant for allergic rhinitis who returns for a follow up visit. Roxanna Tao is being seen today for seasonal allergies.     The patient returns for follow-up visit.  He has been having rhinorrhea, sneezing, congestion.  He is off all allergy medications including Singulair, Flonase, Astelin and Zyrtec.  He had been started on allergen immunotherapy for dust mites, weeds and trees.  Unfortunately, the patient has been unable to build via allergen immunotherapy.  He is interested in switching to cluster immunotherapy.  He had tolerated allergen immunotherapy.    History reviewed. No pertinent past medical history.  History reviewed. No pertinent family history.  History reviewed. No pertinent surgical history.    REVIEW OF SYSTEMS:  General: negative for weight gain. negative for weight loss. negative for changes in sleep.   Ears: negative for fullness. negative for hearing loss. negative for dizziness.   Nose: negative for snoring.negative for changes in smell. negative for drainage.   Throat: negative for hoarseness. negative for sore throat. negative for trouble swallowing.   Lungs: negative for shortness of breath.negative for wheezing. negative for sputum production.   Cardiovascular: negative for chest pain. negative for swelling of ankles. negative for fast or irregular heartbeat.   Gastrointestinal: negative for nausea. negative for heartburn. negative for acid reflux.   Musculoskeletal: negative for joint pain. negative for joint stiffness. negative for joint swelling.   Neurologic: negative for seizures. negative for fainting. negative for weakness.   Psychiatric: negative for  changes in mood. negative for anxiety.   Endocrine: negative for cold intolerance. negative for heat intolerance. negative for tremors.   Hematologic: negative for easy bruising. negative for easy bleeding.  Integumentary: negative for rash. negative for scaling. negative for nail changes.       Current Outpatient Prescriptions:      montelukast (SINGULAIR) 10 MG tablet, Take 1 tablet (10 mg) by mouth At Bedtime Office visit for further refills., Disp: 30 tablet, Rfl: 0     fluticasone (FLONASE) 50 MCG/ACT spray, Spray 2 sprays into both nostrils 2 times daily Office visit for further refills, Disp: 1 Bottle, Rfl: 0     cetirizine (ZYRTEC) 10 MG tablet, Take 1 tablet (10 mg) by mouth daily, Disp: 30 tablet, Rfl: 3     azelastine (ASTELIN) 0.1 % spray, Spray 2 sprays into both nostrils 2 times daily, Disp: 1 Bottle, Rfl: 3     EPINEPHrine (EPIPEN/ADRENACLICK/OR ANY BX GENERIC EQUIV) 0.3 MG/0.3ML injection 2-pack, Inject 0.3 mLs (0.3 mg) into the muscle as needed for anaphylaxis, Disp: 0.6 mL, Rfl: 0     [DISCONTINUED] montelukast (SINGULAIR) 10 MG tablet, Take 1 tablet (10 mg) by mouth At Bedtime Office visit for further refills. (Patient not taking: Reported on 4/9/2018), Disp: 30 tablet, Rfl: 0     ORDER FOR ALLERGEN IMMUNOTHERAPY, Dust Mites DF 30,000AU/mL, HS  0.3 ml Dust Mites DP. 30,000 AU/mL, HS  0.3 ml  Jose, White 1:20 w/v, HS  1.0 ml Lamb's Quarters 1:20 w/v, HS 1.0 ml Diluent: HSA qs to 5ml (Patient not taking: Reported on 4/9/2018), Disp: 5 mL, Rfl: prn     fluticasone (FLONASE) 50 MCG/ACT spray, Spray 2 sprays into both nostrils daily, Disp: , Rfl:      Montelukast Sodium (SINGULAIR PO), , Disp: , Rfl:     There is no immunization history on file for this patient.  No Known Allergies      EXAM:   Constitutional:  Appears well-developed and well-nourished. No distress.   HEENT:   Head: Normocephalic.   Mouth/Throat: No oropharyngeal exudate present.   Cobblestoning of posterior oropharynx.   Boggy nasal  tissue and pale.    Eyes: Conjunctivae are non-erythematous   Cardiovascular: Normal rate, regular rhythm and normal heart sounds. Exam reveals no gallop and no friction rub.   No murmur heard.  Respiratory: Effort normal and breath sounds normal. No respiratory distress. No wheezes. No rales.   Musculoskeletal: Normal range of motion.   Lymphadenopathy:   No cervical adenopathy.   No lower extremity edema.   Neuro: Oriented to person, place, and time.  Skin: Skin is warm and dry. No rash noted.   Psychiatric: Normal mood and affect.     Nursing note and vitals reviewed.    ASSESSMENT/PLAN:  Problem List Items Addressed This Visit        Respiratory    Allergic rhinitis due to dust mite     Perennial nasal and ocular symptoms for the last 11 years. Flonase has been used and non-beneficial. Zyrtec, montelukast and other oral antihistamines have been non-beneficial. Using refresh eyedrops. Allergy testing 7-8 years ago positive for at least dust mites. Using that a netti pot and this has been beneficial.      - Flonase 50mcg 2 sprays/nostril q day. Use consistently.   - Azelastine 2 sprays/nostril twice daily as needed.   - Cetirizine (Zyrtec) 10mg by mouth daily.   - On allergen immunotherapy and tolerating.  However, difficulty building with allergen immunotherapy. Switching to cluster immunotherapy.          Relevant Medications    montelukast (SINGULAIR) 10 MG tablet    fluticasone (FLONASE) 50 MCG/ACT spray    cetirizine (ZYRTEC) 10 MG tablet    azelastine (ASTELIN) 0.1 % spray    Chronic seasonal allergic rhinitis due to pollen    Relevant Medications    montelukast (SINGULAIR) 10 MG tablet    fluticasone (FLONASE) 50 MCG/ACT spray    cetirizine (ZYRTEC) 10 MG tablet    azelastine (ASTELIN) 0.1 % spray          Chart documentation with Dragon Voice recognition Software. Although reviewed after completion, some words and grammatical errors may remain.    Ray Mcleod, DO   Allergy/Immunology  [place]  [facility]      Again, thank you for allowing me to participate in the care of your patient.        Sincerely,        Ray Mcleod, DO

## 2018-04-10 ENCOUNTER — TELEPHONE (OUTPATIENT)
Dept: ALLERGY | Facility: CLINIC | Age: 38
End: 2018-04-10

## 2018-04-10 NOTE — ASSESSMENT & PLAN NOTE
Perennial nasal and ocular symptoms for the last 11 years. Flonase has been used and non-beneficial. Zyrtec, montelukast and other oral antihistamines have been non-beneficial. Using refresh eyedrops. Allergy testing 7-8 years ago positive for at least dust mites. Using that a netti pot and this has been beneficial.      - Flonase 50mcg 2 sprays/nostril q day. Use consistently.   - Azelastine 2 sprays/nostril twice daily as needed.   - Cetirizine (Zyrtec) 10mg by mouth daily.   - On allergen immunotherapy and tolerating.  However, difficulty building with allergen immunotherapy. Switching to cluster immunotherapy.

## 2018-04-10 NOTE — TELEPHONE ENCOUNTER
Patient will be starting cluster immunotherapy at [facility] on 18.  His green vials have .  Please send blue vials to compounding to be mixed down.  Consent signed by patient and sent to scanning.  Thank you.    Aparna Alex RN

## 2018-04-12 NOTE — TELEPHONE ENCOUNTER
Vials will be sent to [facility] on 4/13/2018 when injection staff is at [place].    Jaelyn Claire RN

## 2018-04-13 NOTE — TELEPHONE ENCOUNTER
was scheduled. Tracking number is [id number]. Blue vial was sent to compounding pharmacy to be mixed down to green.     Jaelyn Claire RN

## 2018-04-13 NOTE — TELEPHONE ENCOUNTER
ALLERGY SOLUTION RE-ORDER REQUEST    Roxanna WAGNER Palomoshamika 1980 MRN: [id number]    DATE NEEDED:  4/27/2018  Vial Color Content   Top Dose       Last Dose     Vial Size  Green 1:1,000 Dust Mite, Trees, Weeds  Red 1:1 0.5       Blue 1:1000.2     5 ml    Shot Clinic Location:  [place]  Ship to Location: [place]  Special Instructions:  Mix down blue to green. Vial was sent to compounding at 9 am 4/13/2018.      Updated Prescription Needed: No      Requester Signature  Jaelyn Claire

## 2018-04-18 DIAGNOSIS — J31.0 CHRONIC RHINITIS: Primary | ICD-10-CM

## 2018-04-18 PROCEDURE — 95165 ANTIGEN THERAPY SERVICES: CPT | Performed by: ALLERGY & IMMUNOLOGY

## 2018-04-18 NOTE — PROGRESS NOTES
Allergy serums billed at [place].     Vials received below:    Vial Color Content                      Vial Size Expiration Date  Green 1:1,000 Dust Mite, Trees, Weeds 5mL  8/16/2018      Original Refill encounter date: 4/10/2018      Signature  Jaelyn Claire

## 2018-04-18 NOTE — TELEPHONE ENCOUNTER
Allergy serums received at [place].     Vials received below:    Vial Color Content                      Vial Size Expiration Date  Green 1:1,000 Dust Mite, Trees, Weeds 5mL  8/16/2018      Signature  Jaelyn Claire

## 2018-04-30 ENCOUNTER — OFFICE VISIT (OUTPATIENT)
Dept: ALLERGY | Facility: CLINIC | Age: 38
End: 2018-04-30
Payer: COMMERCIAL

## 2018-04-30 VITALS
OXYGEN SATURATION: 97 % | DIASTOLIC BLOOD PRESSURE: 78 MMHG | HEART RATE: 80 BPM | WEIGHT: 159.8 LBS | TEMPERATURE: 98.2 F | SYSTOLIC BLOOD PRESSURE: 121 MMHG | BODY MASS INDEX: 25.79 KG/M2

## 2018-04-30 DIAGNOSIS — J30.1 CHRONIC SEASONAL ALLERGIC RHINITIS DUE TO POLLEN: Primary | ICD-10-CM

## 2018-04-30 DIAGNOSIS — J30.89 ALLERGIC RHINITIS DUE TO DUST MITE: ICD-10-CM

## 2018-04-30 PROCEDURE — 95180 RAPID DESENSITIZATION: CPT | Performed by: ALLERGY & IMMUNOLOGY

## 2018-04-30 PROCEDURE — 99207 ZZC DROP WITH A PROCEDURE: CPT | Mod: 25 | Performed by: ALLERGY & IMMUNOLOGY

## 2018-04-30 NOTE — ASSESSMENT & PLAN NOTE
Perennial nasal and ocular symptoms for the last 11 years. Flonase has been used and non-beneficial. Zyrtec, montelukast and other oral antihistamines have been non-beneficial. Using refresh eyedrops.  Using that a netti pot and this has been beneficial.     Tolerated cluster immunotherapy today.       - Flonase 50mcg 2 sprays/nostril q day.   - Azelastine 2 sprays/nostril twice daily as needed.   - Cetirizine (Zyrtec) 10mg by mouth daily.   - On allergen immunotherapy and tolerating. Continue cluster immunotherapy.

## 2018-04-30 NOTE — LETTER
4/30/2018         RE: Roxanna Tao  [address] [facility] [address]        Dear Colleague,    Thank you for referring your patient, Roxanna Tao, to the [facility]. Please see a copy of my visit note below.    Roxanna Tao is a 38 year old  male with previous medical history significant for allergic rhinitis who returns for a follow up visit. Roxanna Tao is being seen today for cluster immunotherapy.    Patient presents today for cluster immunotherapy. The patient is currently in a good state of health. No recent fevers, chills, cough, wheezing, shortness of breath, skin rash, angioedema, nausea, vomiting or diarrhea. The risks and benefits were discussed and the patient/patient's family wishes to proceed. The consent was signed.    History reviewed. No pertinent past medical history.  History reviewed. No pertinent family history.  History reviewed. No pertinent surgical history.    REVIEW OF SYSTEMS:  General: negative for weight gain. negative for weight loss. negative for changes in sleep.   Ears: negative for fullness. negative for hearing loss. negative for dizziness.   Nose: negative for snoring.negative for changes in smell. negative for drainage.   Throat: negative for hoarseness. negative for sore throat. negative for trouble swallowing.   Lungs: negative for shortness of breath.negative for wheezing. negative for sputum production.   Cardiovascular: negative for chest pain. negative for swelling of ankles. negative for fast or irregular heartbeat.   Gastrointestinal: negative for nausea. negative for heartburn. negative for acid reflux.   Musculoskeletal: negative for joint pain. negative for joint stiffness. negative for joint swelling.   Neurologic: negative for seizures. negative for fainting. negative for weakness.   Psychiatric: negative for changes in mood. negative for anxiety.   Endocrine: negative for cold intolerance. negative for heat  intolerance. negative for tremors.   Hematologic: negative for easy bruising. negative for easy bleeding.  Integumentary: negative for rash. negative for scaling. negative for nail changes.       Current Outpatient Prescriptions:      azelastine (ASTELIN) 0.1 % spray, Spray 2 sprays into both nostrils 2 times daily, Disp: 1 Bottle, Rfl: 3     cetirizine (ZYRTEC) 10 MG tablet, Take 1 tablet (10 mg) by mouth daily, Disp: 30 tablet, Rfl: 3     EPINEPHrine (EPIPEN/ADRENACLICK/OR ANY BX GENERIC EQUIV) 0.3 MG/0.3ML injection 2-pack, Inject 0.3 mLs (0.3 mg) into the muscle as needed for anaphylaxis, Disp: 0.6 mL, Rfl: 0     fluticasone (FLONASE) 50 MCG/ACT spray, Spray 2 sprays into both nostrils daily, Disp: , Rfl:      fluticasone (FLONASE) 50 MCG/ACT spray, Spray 2 sprays into both nostrils 2 times daily Office visit for further refills, Disp: 1 Bottle, Rfl: 0     montelukast (SINGULAIR) 10 MG tablet, Take 1 tablet (10 mg) by mouth At Bedtime Office visit for further refills., Disp: 30 tablet, Rfl: 0     Montelukast Sodium (SINGULAIR PO), , Disp: , Rfl:      ORDER FOR ALLERGEN IMMUNOTHERAPY, Dust Mites DF 30,000AU/mL, HS  0.3 ml Dust Mites DP. 30,000 AU/mL, HS  0.3 ml  Jose, White 1:20 w/v, HS  1.0 ml Lamb's Quarters 1:20 w/v, HS 1.0 ml Diluent: HSA qs to 5ml, Disp: 5 mL, Rfl: prn    There is no immunization history on file for this patient.  No Known Allergies      EXAM:   Constitutional:  Appears well-developed and well-nourished. No distress.   HEENT:   Head: Normocephalic.   Boggy nasal tissue and pale.    Cardiovascular: Normal rate, regular rhythm and normal heart sounds. Exam reveals no gallop and no friction rub.   No murmur heard.  Respiratory: Effort normal and breath sounds normal. No respiratory distress. No wheezes. No rales.   Musculoskeletal: Normal range of motion.   Neuro: Oriented to person, place, and time.  Skin: Skin is warm and dry. No rash noted.   Psychiatric: Normal mood and affect.     Nursing  note and vitals reviewed.      WORKUP:   Cluster immunotherapy   The patient received green 0.1, green 0.4 and blue 0.1ml today. Each dose was  by 30 minutes. Full report will be scanned into electronic medical record. The pateint was in office for over 2 hours.    ASSESSMENT/PLAN:  Problem List Items Addressed This Visit        Respiratory    Allergic rhinitis due to dust mite     Perennial nasal and ocular symptoms for the last 11 years. Flonase has been used and non-beneficial. Zyrtec, montelukast and other oral antihistamines have been non-beneficial. Using refresh eyedrops.  Using that a netti pot and this has been beneficial.     Tolerated cluster immunotherapy today.       - Flonase 50mcg 2 sprays/nostril q day.   - Azelastine 2 sprays/nostril twice daily as needed.   - Cetirizine (Zyrtec) 10mg by mouth daily.   - On allergen immunotherapy and tolerating. Continue cluster immunotherapy.          Relevant Orders    RAPID DESENSITIZATION    Chronic seasonal allergic rhinitis due to pollen - Primary    Relevant Orders    RAPID DESENSITIZATION          Chart documentation with Dragon Voice recognition Software. Although reviewed after completion, some words and grammatical errors may remain.    Ray Mcleod,    Allergy/Immunology  [facility] and [facility]      Again, thank you for allowing me to participate in the care of your patient.        Sincerely,        Ray Mcleod, DO

## 2018-04-30 NOTE — MR AVS SNAPSHOT
After Visit Summary   4/30/2018    Roxanna Tao    MRN: [id number]           Patient Information     Date Of Birth          1980        Visit Information        Provider Department      4/30/2018 4:20 PM Ray Mcleod DO [facility]        Today's Diagnoses     Chronic seasonal allergic rhinitis due to pollen    -  1    Allergic rhinitis due to dust mite          Care Instructions    Allergy Staff Appt Hours Shot Hours Locations    Physician     Ray Mcleod DO       Support Staff     Aparna WESTFALL RN      Fariba WESTFALL, [facility]  Monday:                      [place] 8-7     Tuesday:         [place] 8-5     Wednesday:        [place]: 7-5     Friday:        [place] 7-5   [place]        Monday: 9-5:50        Wednesday: 2-5:50        Friday: 7-12:50     [place]        Tuesday: 7-10:50        Thursday: 1:30-6:30        Friday: 8:00-3:50     [place]        Monday: 7:10-4:50        Tuesday: 12:30-6:30        Thursday: 7-11:50 [facility]  [address] [place] [address]  Appt Line: [phone]  Allergy RN (Monday):  [phone]    [facility]  [address] [place] [address]  Appt Line: [phone]  Allergy RN (Tues & Wed):  [phone]    [facility]  [address] [place] [address]  Appt Line: [phone]  Allergy RN (Friday):  [phone]       Important Scheduling Information  Aspirin Desensitization: Appt will last 2 clinic days. Please call the Allergy RN line for your clinic to schedule. Discontinue antihistamines 7 days prior to the appointment.     Food Challenges: Appt will last 3-4 hours. Please call the Allergy RN line for your clinic to schedule. Discontinue antihistamines 7 days prior to the appointment.     Penicillin Testing: Appt will last 2-3 hours. Please call the Allergy RN line for your clinic to schedule. Discontinue antihistamines 7 days prior to the appointment.     Skin Testing: Appt will about 40 minutes. Call  the appointment line for your clinic to schedule. Discontinue antihistamines 7 days prior to the appointment.     Venom Testing: Appt will last 2-3 hours. Please call the Allergy RN line for your clinic to schedule. Discontinue antihistamines 7 days prior to the appointment.     Thank you for trusting us with your Allergy, Asthma, and Immunology care. Please feel free to contact us with any questions or concerns you may have.                Follow-ups after your visit        Your next 10 appointments already scheduled     May 07, 2018  4:20 PM CDT   Return Visit with Ray Mcleod DO   [facility] [place] [facility] [place]    [address]  [place] [address]   [phone]            May 14, 2018  4:20 PM CDT   Return Visit with Ray Mcleod DO   [facility] [place] [facility] [place]    [address]  [place] [address]   [phone]            May 21, 2018  4:20 PM CDT   Return Visit with Ray Mcleod DO   [facility] [place] [facility] [place]    [address]  [place] [address]   [phone]            Jun 01, 2018  7:30 AM CDT   Nurse Only with AN ALLERGY SHOTS   [facility] [place] ([facility] [place]    [address]  [place] [address]   [phone]            Jun 04, 2018  4:20 PM CDT   Return Visit with Ray Mcleod DO   [facility] [place] ([facility] [place]    [address]  [place] [address]   [phone]            Jun 11, 2018  4:20 PM CDT   Return Visit with Ray Mcleod DO   [facility] [place] ([facility] [place]    [address]  [place] [address]   [phone]            Jun 18, 2018  4:20 PM CDT   Return Visit with Ray Mcleod DO   [facility] [place] ([facility] [place]    [address]  [place] [address]   [phone]              Who to contact     If you have questions or need follow up  "information about today's clinic visit or your schedule please contact [facility] directly at [phone].  Normal or non-critical lab and imaging results will be communicated to you by MyChart, letter or phone within 4 business days after the clinic has received the results. If you do not hear from us within 7 days, please contact the clinic through MyChart or phone. If you have a critical or abnormal lab result, we will notify you by phone as soon as possible.  Submit refill requests through [employer] or call your pharmacy and they will forward the refill request to us. Please allow 3 business days for your refill to be completed.          Additional Information About Your Visit        MyChart Information     [employer] lets you send messages to your doctor, view your test results, renew your prescriptions, schedule appointments and more. To sign up, go to [email or web address] . Click on \"Log in\" on the left side of the screen, which will take you to the Welcome page. Then click on \"Sign up Now\" on the right side of the page.     You will be asked to enter the access code listed below, as well as some personal information. Please follow the directions to create your username and password.     Your access code is: [email or web address]  Expires: 2018  6:26 PM     Your access code will  in 90 days. If you need help or a new code, please call your [facility] clinic or [phone].        Care EveryWhere ID     This is your Care EveryWhere ID. This could be used by other organizations to access your [facility] medical records  [id number]        Your Vitals Were     Pulse Temperature Pulse Oximetry BMI (Body Mass Index)          80 98.2  F (36.8  C) (Oral) 97% 25.79 kg/m2         Blood Pressure from Last 3 Encounters:   18 121/78   18 124/86   17 120/83    Weight from Last 3 Encounters:   18 72.5 kg (159 lb 12.8 oz)   18 73.5 kg (162 lb)   17 71.3 kg (157 lb 3.2 oz)    "           We Performed the Following     RAPID DESENSITIZATION        Primary Care Provider Office Phone # Fax #    [facility] [phone] [address] [facility] [address]        Equal Access to Services     RICHARD GHOSH : Hadii alice ku hadluluo Soomaali, waaxda luqadaha, qaybta kaalmada adeegyada, viktor dominguez alexsandrasaul fan singh bennett. So [facility] [phone].    ATENCIÓN: Si habla español, tiene a mc disposición servicios gratuitos de asistencia lingüística. Llame al [phone].    We comply with applicable federal civil rights laws and [place] laws. We do not discriminate on the basis of race, color, national origin, age, disability, sex, sexual orientation, or gender identity.            Thank you!     Thank you for choosing [facility]  for your care. Our goal is always to provide you with excellent care. Hearing back from our patients is one way we can continue to improve our services. Please take a few minutes to complete the written survey that you may receive in the mail after your visit with us. Thank you!             Your Updated Medication List - Protect others around you: Learn how to safely use, store and throw away your medicines at www.disposemymeds.org.          This list is accurate as of 4/30/18  6:26 PM.  Always use your most recent med list.                   Brand Name Dispense Instructions for use Diagnosis    ALLERGEN IMMUNOTHERAPY PRESCRIPTION     5 mL    Dust Mites DF 30,000AU/mL, HS  0.3 ml Dust Mites DP. 30,000 AU/mL, HS  0.3 ml  Lulu, White 1:20 w/v, HS  1.0 ml Lamb's Quarters 1:20 w/v, HS 1.0 ml Diluent: HSA qs to 5ml    Allergic rhinitis due to dust mite       azelastine 0.1 % spray    ASTELIN    1 Bottle    Spray 2 sprays into both nostrils 2 times daily    Chronic seasonal allergic rhinitis due to pollen       cetirizine 10 MG tablet    zyrTEC    30 tablet    Take 1 tablet (10 mg) by mouth daily    Chronic seasonal allergic  rhinitis due to pollen       EPINEPHrine 0.3 MG/0.3ML injection 2-pack    EPIPEN/ADRENACLICK/or ANY BX GENERIC EQUIV    0.6 mL    Inject 0.3 mLs (0.3 mg) into the muscle as needed for anaphylaxis    Need for desensitization to allergens       * fluticasone 50 MCG/ACT spray    FLONASE     Spray 2 sprays into both nostrils daily        * fluticasone 50 MCG/ACT spray    FLONASE    1 Bottle    Spray 2 sprays into both nostrils 2 times daily Office visit for further refills    Chronic seasonal allergic rhinitis due to pollen       * SINGULAIR PO           * montelukast 10 MG tablet    SINGULAIR    30 tablet    Take 1 tablet (10 mg) by mouth At Bedtime Office visit for further refills.    Chronic seasonal allergic rhinitis due to pollen       * Notice:  This list has 4 medication(s) that are the same as other medications prescribed for you. Read the directions carefully, and ask your doctor or other care provider to review them with you.

## 2018-04-30 NOTE — PATIENT INSTRUCTIONS
Allergy Staff Appt Hours Shot Hours Locations    Physician     Ray Mcleod DO       Support Staff     Aparna WESTFALL, RN      Fariba WESTFALL, [facility]  Monday:                      [place] 8-7     Tuesday:         [place] 8-5     Wednesday:        [place]: 7-5     Friday:        [place] 7-5   [place]        Monday: 9-5:50        Wednesday: 2-5:50        Friday: 7-12:50     [place]        Tuesday: 7-10:50        Thursday: 1:30-6:30        Friday: 8:00-3:50     [place] Monday: 7:10-4:50        Tuesday: 12:30-6:30        Thursday: 7-11:50 [facility]  [address] [place] [address]  Appt Line: [phone]  Allergy RN (Monday):  [phone]    [facility]  [address] [place] [address]  Appt Line: [phone]  Allergy RN (Tues & Wed):  [phone]    [facility]  [address] [place] [address]  Appt Line: [phone]  Allergy RN (Friday):  [phone]       Important Scheduling Information  Aspirin Desensitization: Appt will last 2 clinic days. Please call the Allergy RN line for your clinic to schedule. Discontinue antihistamines 7 days prior to the appointment.     Food Challenges: Appt will last 3-4 hours. Please call the Allergy RN line for your clinic to schedule. Discontinue antihistamines 7 days prior to the appointment.     Penicillin Testing: Appt will last 2-3 hours. Please call the Allergy RN line for your clinic to schedule. Discontinue antihistamines 7 days prior to the appointment.     Skin Testing: Appt will about 40 minutes. Call the appointment line for your clinic to schedule. Discontinue antihistamines 7 days prior to the appointment.     Venom Testing: Appt will last 2-3 hours. Please call the Allergy RN line for your clinic to schedule. Discontinue antihistamines 7 days prior to the appointment.     Thank you for trusting us with your Allergy, Asthma, and Immunology care. Please feel free to contact us with any questions or concerns you may  have.

## 2018-04-30 NOTE — PROGRESS NOTES
Roxanna Tao is a 38 year old  male with previous medical history significant for allergic rhinitis who returns for a follow up visit. Roxanna Tao is being seen today for cluster immunotherapy.    Patient presents today for cluster immunotherapy. The patient is currently in a good state of health. No recent fevers, chills, cough, wheezing, shortness of breath, skin rash, angioedema, nausea, vomiting or diarrhea. The risks and benefits were discussed and the patient/patient's family wishes to proceed. The consent was signed.    History reviewed. No pertinent past medical history.  History reviewed. No pertinent family history.  History reviewed. No pertinent surgical history.    REVIEW OF SYSTEMS:  General: negative for weight gain. negative for weight loss. negative for changes in sleep.   Ears: negative for fullness. negative for hearing loss. negative for dizziness.   Nose: negative for snoring.negative for changes in smell. negative for drainage.   Throat: negative for hoarseness. negative for sore throat. negative for trouble swallowing.   Lungs: negative for shortness of breath.negative for wheezing. negative for sputum production.   Cardiovascular: negative for chest pain. negative for swelling of ankles. negative for fast or irregular heartbeat.   Gastrointestinal: negative for nausea. negative for heartburn. negative for acid reflux.   Musculoskeletal: negative for joint pain. negative for joint stiffness. negative for joint swelling.   Neurologic: negative for seizures. negative for fainting. negative for weakness.   Psychiatric: negative for changes in mood. negative for anxiety.   Endocrine: negative for cold intolerance. negative for heat intolerance. negative for tremors.   Hematologic: negative for easy bruising. negative for easy bleeding.  Integumentary: negative for rash. negative for scaling. negative for nail changes.       Current Outpatient Prescriptions:      azelastine (ASTELIN)  0.1 % spray, Spray 2 sprays into both nostrils 2 times daily, Disp: 1 Bottle, Rfl: 3     cetirizine (ZYRTEC) 10 MG tablet, Take 1 tablet (10 mg) by mouth daily, Disp: 30 tablet, Rfl: 3     EPINEPHrine (EPIPEN/ADRENACLICK/OR ANY BX GENERIC EQUIV) 0.3 MG/0.3ML injection 2-pack, Inject 0.3 mLs (0.3 mg) into the muscle as needed for anaphylaxis, Disp: 0.6 mL, Rfl: 0     fluticasone (FLONASE) 50 MCG/ACT spray, Spray 2 sprays into both nostrils daily, Disp: , Rfl:      fluticasone (FLONASE) 50 MCG/ACT spray, Spray 2 sprays into both nostrils 2 times daily Office visit for further refills, Disp: 1 Bottle, Rfl: 0     montelukast (SINGULAIR) 10 MG tablet, Take 1 tablet (10 mg) by mouth At Bedtime Office visit for further refills., Disp: 30 tablet, Rfl: 0     Montelukast Sodium (SINGULAIR PO), , Disp: , Rfl:      ORDER FOR ALLERGEN IMMUNOTHERAPY, Dust Mites DF 30,000AU/mL, HS  0.3 ml Dust Mites DP. 30,000 AU/mL, HS  0.3 ml  Jose, White 1:20 w/v, HS  1.0 ml Lamb's Quarters 1:20 w/v, HS 1.0 ml Diluent: HSA qs to 5ml, Disp: 5 mL, Rfl: prn    There is no immunization history on file for this patient.  No Known Allergies      EXAM:   Constitutional:  Appears well-developed and well-nourished. No distress.   HEENT:   Head: Normocephalic.   Boggy nasal tissue and pale.    Cardiovascular: Normal rate, regular rhythm and normal heart sounds. Exam reveals no gallop and no friction rub.   No murmur heard.  Respiratory: Effort normal and breath sounds normal. No respiratory distress. No wheezes. No rales.   Musculoskeletal: Normal range of motion.   Neuro: Oriented to person, place, and time.  Skin: Skin is warm and dry. No rash noted.   Psychiatric: Normal mood and affect.     Nursing note and vitals reviewed.      WORKUP:   Cluster immunotherapy   The patient received green 0.1, green 0.4 and blue 0.1ml today. Each dose was  by 30 minutes. Full report will be scanned into electronic medical record. The pateint was in office for  over 2 hours.    ASSESSMENT/PLAN:  Problem List Items Addressed This Visit        Respiratory    Allergic rhinitis due to dust mite     Perennial nasal and ocular symptoms for the last 11 years. Flonase has been used and non-beneficial. Zyrtec, montelukast and other oral antihistamines have been non-beneficial. Using refresh eyedrops.  Using that a netti pot and this has been beneficial.     Tolerated cluster immunotherapy today.       - Flonase 50mcg 2 sprays/nostril q day.   - Azelastine 2 sprays/nostril twice daily as needed.   - Cetirizine (Zyrtec) 10mg by mouth daily.   - On allergen immunotherapy and tolerating. Continue cluster immunotherapy.          Relevant Orders    RAPID DESENSITIZATION    Chronic seasonal allergic rhinitis due to pollen - Primary    Relevant Orders    RAPID DESENSITIZATION          Chart documentation with Dragon Voice recognition Software. Although reviewed after completion, some words and grammatical errors may remain.    Ray Mcleod,    Allergy/Immunology  [facility], [place], [place]

## 2018-05-07 ENCOUNTER — OFFICE VISIT (OUTPATIENT)
Dept: ALLERGY | Facility: CLINIC | Age: 38
End: 2018-05-07
Payer: COMMERCIAL

## 2018-05-07 VITALS
SYSTOLIC BLOOD PRESSURE: 114 MMHG | OXYGEN SATURATION: 100 % | WEIGHT: 160 LBS | HEART RATE: 82 BPM | DIASTOLIC BLOOD PRESSURE: 77 MMHG | TEMPERATURE: 98.2 F | BODY MASS INDEX: 25.82 KG/M2

## 2018-05-07 DIAGNOSIS — J30.1 CHRONIC SEASONAL ALLERGIC RHINITIS DUE TO POLLEN: Primary | ICD-10-CM

## 2018-05-07 DIAGNOSIS — J30.89 ALLERGIC RHINITIS DUE TO DUST MITE: ICD-10-CM

## 2018-05-07 PROCEDURE — 99207 ZZC DROP WITH A PROCEDURE: CPT | Mod: 25 | Performed by: ALLERGY & IMMUNOLOGY

## 2018-05-07 PROCEDURE — 95180 RAPID DESENSITIZATION: CPT | Performed by: ALLERGY & IMMUNOLOGY

## 2018-05-07 NOTE — MR AVS SNAPSHOT
After Visit Summary   5/7/2018    Roxanna Tao    MRN: [id number]           Patient Information     Date Of Birth          1980        Visit Information        Provider Department      5/7/2018 4:20 PM Ray Mcleod DO [facility]        Today's Diagnoses     Chronic seasonal allergic rhinitis due to pollen    -  1    Allergic rhinitis due to dust mite          Care Instructions    Allergy Staff Appt Hours Shot Hours Locations    Physician     Ray Mcleod DO       Support Staff     Aparna WESTFALL RN      Fariba WESTFALL, [facility]  Monday:                      [place] 8-7     Tuesday:         [place] 8-5     Wednesday:        [place]: 7-5     Friday:        [place] 7-5   [place]        Monday: 9-5:50        Wednesday: 2-5:50        Friday: 7-12:50     [place]        Tuesday: 7-10:50        Thursday: 1:30-6:30        Friday: 8:00-3:50     [place]        Monday: 7:10-4:50        Tuesday: 12:30-6:30        Thursday: 7-11:50 [facility]  [address] [place] [address]  Appt Line: [phone]  Allergy RN (Monday):  [phone]    [facility] [place]  [address] [place] [address]  Appt Line: [phone]  Allergy RN (Tues & Wed):  [phone]    [facility]  [address] [place] [address]  Appt Line: [phone]  Allergy RN (Friday):  [phone]       Important Scheduling Information  Aspirin Desensitization: Appt will last 2 clinic days. Please call the Allergy RN line for your clinic to schedule. Discontinue antihistamines 7 days prior to the appointment.     Food Challenges: Appt will last 3-4 hours. Please call the Allergy RN line for your clinic to schedule. Discontinue antihistamines 7 days prior to the appointment.     Penicillin Testing: Appt will last 2-3 hours. Please call the Allergy RN line for your clinic to schedule. Discontinue antihistamines 7 days prior to the appointment.     Skin Testing: Appt will about 40 minutes. Call the  appointment line for your clinic to schedule. Discontinue antihistamines 7 days prior to the appointment.     Venom Testing: Appt will last 2-3 hours. Please call the Allergy RN line for your clinic to schedule. Discontinue antihistamines 7 days prior to the appointment.     Thank you for trusting us with your Allergy, Asthma, and Immunology care. Please feel free to contact us with any questions or concerns you may have.                Follow-ups after your visit        Your next 10 appointments already scheduled     May 14, 2018  4:20 PM CDT   Return Visit with Ray Mcleod DO   [facility] [place] ([facility] [place]    [address]  [place] [address]   [phone]            May 21, 2018  4:20 PM CDT   Return Visit with Ray Mcleod DO   [facility] [place] ([facility] [place])    [address]  [place] [address]   [phone]            Jun 01, 2018  7:30 AM CDT   Nurse Only with AN ALLERGY SHOTS   [facility] [place] ([facility] [place])    [address]  [place] [address]   [phone]            Jun 04, 2018  4:20 PM CDT   Return Visit with Ray Mcleod DO   [facility] [place] ([facility] [place])    [address]  [place] [address]   [phone]            Jun 11, 2018  4:20 PM CDT   Return Visit with Ray Mcleod DO   [facility] [place] ([facility] [place])    [address]  [place] [address] [phone]            Jun 18, 2018  4:20 PM CDT   Return Visit with Ray Mcleod DO   [facility] [place] ([facility] [place])    [address]   [place] [address] [phone]              Who to contact     If you have questions or need follow up information about today's clinic visit or your schedule please contact [facility] directly at [phone].  Normal or non-critical lab and imaging results will be communicated to you by  "MyChart, letter or phone within 4 business days after the clinic has received the results. If you do not hear from us within 7 days, please contact the clinic through [employer] or phone. If you have a critical or abnormal lab result, we will notify you by phone as soon as possible.  Submit refill requests through [employer] or call your pharmacy and they will forward the refill request to us. Please allow 3 business days for your refill to be completed.          Additional Information About Your Visit        [employer] [employer] [facility] Information     [employer] lets you send messages to your doctor, view your test results, renew your prescriptions, schedule appointments and more. To sign up, go to [email or web address] . Click on \"Log in\" on the left side of the screen, which will take you to the Welcome page. Then click on \"Sign up Now\" on the right side of the page.     You will be asked to enter the access code listed below, as well as some personal information. Please follow the directions to create your username and password.     Your access code is: [email or web address]  Expires: 2018  6:26 PM     Your access code will  in 90 days. If you need help or a new code, please call your [facility] or [phone].        Care EveryWhere ID     This is your Care EveryWhere ID. This could be used by other organizations to access your [place] medical records  [id number]        Your Vitals Were     Pulse Temperature Pulse Oximetry BMI (Body Mass Index)          82 98.2  F (36.8  C) (Oral) 100% 25.82 kg/m2         Blood Pressure from Last 3 Encounters:   18 114/77   18 121/78   18 124/86    Weight from Last 3 Encounters:   18 72.6 kg (160 lb)   18 72.5 kg (159 lb 12.8 oz)   18 73.5 kg (162 lb)              We Performed the Following     RAPID DESENSITIZATION        Primary Care Provider Office Phone # Fax #    [facility] [phone] [address] [facility] " [id number]        Equal Access to Services     [facility]: Hadii alice hwang keeley Steen, waanjuda luqadaha, qaybta karena alexsandrabetotrudi, waxfede carine monahansandralawrence bennett. So [facility] [phone].    ATENCIÓN: Si habla español, tiene a mc disposición servicios gratuitos de asistencia lingüística. Laura al [phone].    We comply with applicable federal civil rights laws and [place] laws. We do not discriminate on the basis of race, color, national origin, age, disability, sex, sexual orientation, or gender identity.            Thank you!     Thank you for choosing [facility]  [facility]. Our goal is always to provide you with excellent care. Hearing back from our patients is one way we can continue to improve our services. Please take a few minutes to complete the written survey that you may receive in the mail after your visit with us. Thank you!             Your Updated Medication List - Protect others around you: Learn how to safely use, store and throw away your medicines at www.disposemymeds.org.          This list is accurate as of 5/7/18  6:28 PM.  Always use your most recent med list.                   Brand Name Dispense Instructions for use Diagnosis    ALLERGEN IMMUNOTHERAPY PRESCRIPTION     5 mL    Dust Mites DF 30,000AU/mL, HS  0.3 ml Dust Mites DP. 30,000 AU/mL, HS  0.3 ml  Jose, White 1:20 w/v, HS  1.0 ml Lamb's Quarters 1:20 w/v, HS 1.0 ml Diluent: HSA qs to 5ml    Allergic rhinitis due to dust mite       azelastine 0.1 % spray    ASTELIN    1 Bottle    Spray 2 sprays into both nostrils 2 times daily    Chronic seasonal allergic rhinitis due to pollen       cetirizine 10 MG tablet    zyrTEC    30 tablet    Take 1 tablet (10 mg) by mouth daily    Chronic seasonal allergic rhinitis due to pollen       EPINEPHrine 0.3 MG/0.3ML injection 2-pack    EPIPEN/ADRENACLICK/or ANY BX GENERIC EQUIV    0.6 mL    Inject 0.3 mLs (0.3 mg) into the muscle as needed for anaphylaxis    Need for  desensitization to allergens       * fluticasone 50 MCG/ACT spray    FLONASE     Spray 2 sprays into both nostrils daily        * fluticasone 50 MCG/ACT spray    FLONASE    1 Bottle    Spray 2 sprays into both nostrils 2 times daily Office visit for further refills    Chronic seasonal allergic rhinitis due to pollen       * SINGULAIR PO           * montelukast 10 MG tablet    SINGULAIR    30 tablet    Take 1 tablet (10 mg) by mouth At Bedtime Office visit for further refills.    Chronic seasonal allergic rhinitis due to pollen       * Notice:  This list has 4 medication(s) that are the same as other medications prescribed for you. Read the directions carefully, and ask your doctor or other care provider to review them with you.

## 2018-05-07 NOTE — LETTER
5/7/2018         RE: Roxanna Tao  [address] [address] [address]        Dear Colleague,    Thank you for referring your patient, Roxanna Tao, to the [facility]. Please see a copy of my visit note below.    Roxanna Tao is a 38 year old  male with previous medical history significant for allergic rhinitis who returns for a follow up visit. Roxanna Tao is being seen today for cluster immunotherapy.     Patient presents today for cluster immunotherapy. The patient is currently in a good state of health. No recent fevers, chills, cough, wheezing, shortness of breath, skin rash, angioedema, nausea, vomiting or diarrhea. The risks and benefits were discussed and the patient/patient's family wishes to proceed. The consent was signed.    History reviewed. No pertinent past medical history.  History reviewed. No pertinent family history.  History reviewed. No pertinent surgical history.    REVIEW OF SYSTEMS:  General: negative for weight gain. negative for weight loss. negative for changes in sleep.   Ears: negative for fullness. negative for hearing loss. negative for dizziness.   Nose: negative for snoring.negative for changes in smell. negative for drainage.   Throat: negative for hoarseness. negative for sore throat. negative for trouble swallowing.   Lungs: negative for shortness of breath.negative for wheezing. negative for sputum production.   Cardiovascular: negative for chest pain. negative for swelling of ankles. negative for fast or irregular heartbeat.   Gastrointestinal: negative for nausea. negative for heartburn. negative for acid reflux.   Musculoskeletal: negative for joint pain. negative for joint stiffness. negative for joint swelling.   Neurologic: negative for seizures. negative for fainting. negative for weakness.   Psychiatric: negative for changes in mood. negative for anxiety.   Endocrine: negative for cold intolerance. negative for heat  intolerance. negative for tremors.   Hematologic: negative for easy bruising. negative for easy bleeding.  Integumentary: negative for rash. negative for scaling. negative for nail changes.       Current Outpatient Prescriptions:      azelastine (ASTELIN) 0.1 % spray, Spray 2 sprays into both nostrils 2 times daily, Disp: 1 Bottle, Rfl: 3     cetirizine (ZYRTEC) 10 MG tablet, Take 1 tablet (10 mg) by mouth daily, Disp: 30 tablet, Rfl: 3     EPINEPHrine (EPIPEN/ADRENACLICK/OR ANY BX GENERIC EQUIV) 0.3 MG/0.3ML injection 2-pack, Inject 0.3 mLs (0.3 mg) into the muscle as needed for anaphylaxis, Disp: 0.6 mL, Rfl: 0     fluticasone (FLONASE) 50 MCG/ACT spray, Spray 2 sprays into both nostrils 2 times daily Office visit for further refills, Disp: 1 Bottle, Rfl: 0     montelukast (SINGULAIR) 10 MG tablet, Take 1 tablet (10 mg) by mouth At Bedtime Office visit for further refills., Disp: 30 tablet, Rfl: 0     ORDER FOR ALLERGEN IMMUNOTHERAPY, Dust Mites DF 30,000AU/mL, HS  0.3 ml Dust Mites DP. 30,000 AU/mL, HS  0.3 ml  Jose, White 1:20 w/v, HS  1.0 ml Lamb's Quarters 1:20 w/v, HS 1.0 ml Diluent: HSA qs to 5ml, Disp: 5 mL, Rfl: prn     fluticasone (FLONASE) 50 MCG/ACT spray, Spray 2 sprays into both nostrils daily, Disp: , Rfl:      Montelukast Sodium (SINGULAIR PO), , Disp: , Rfl:     There is no immunization history on file for this patient.  No Known Allergies      EXAM:   Constitutional:  Appears well-developed and well-nourished. No distress.   HEENT:   Head: Normocephalic.   Cardiovascular: Normal rate, regular rhythm and normal heart sounds. Exam reveals no gallop and no friction rub.   No murmur heard.  Respiratory: Effort normal and breath sounds normal. No respiratory distress. No wheezes. No rales.   Musculoskeletal: Normal range of motion.   Neuro: Oriented to person, place, and time.  Psychiatric: Normal mood and affect.     Nursing note and vitals reviewed.      WORKUP:  Cluster immunotherapy   The patient  received blue 0.2, blue 0.4, and yellow 0.05. Each dose was  by 30 minutes. Full report will be scanned into electronic medical record. The patient was in office for over 1.5 hours.    ASSESSMENT/PLAN:  Problem List Items Addressed This Visit        Respiratory    Allergic rhinitis due to dust mite     Perennial nasal and ocular symptoms for the last 11 years. Flonase has been used and non-beneficial. Zyrtec, montelukast and other oral antihistamines have been non-beneficial. Using refresh eyedrops.  Using that a netti pot and this has been beneficial.      Tolerated cluster immunotherapy today.       - Flonase 50mcg 2 sprays/nostril q day.   - Azelastine 2 sprays/nostril twice daily as needed.   - Cetirizine (Zyrtec) 10mg by mouth daily.   - On allergen immunotherapy and tolerating. Continue cluster immunotherapy.          Relevant Orders    RAPID DESENSITIZATION (Completed)    Chronic seasonal allergic rhinitis due to pollen - Primary    Relevant Orders    RAPID DESENSITIZATION (Completed)          Chart documentation with Dragon Voice recognition Software. Although reviewed after completion, some words and grammatical errors may remain.    Ray Mcleod,    Allergy/Immunology  [facility]      Again, thank you for allowing me to participate in the care of your patient.        Sincerely,        Ray Mcleod, DO

## 2018-05-07 NOTE — PROGRESS NOTES
Roxanna Tao is a 38 year old  male with previous medical history significant for allergic rhinitis who returns for a follow up visit. Roxanna Tao is being seen today for cluster immunotherapy.     Patient presents today for cluster immunotherapy. The patient is currently in a good state of health. No recent fevers, chills, cough, wheezing, shortness of breath, skin rash, angioedema, nausea, vomiting or diarrhea. The risks and benefits were discussed and the patient/patient's family wishes to proceed. The consent was signed.    History reviewed. No pertinent past medical history.  History reviewed. No pertinent family history.  History reviewed. No pertinent surgical history.    REVIEW OF SYSTEMS:  General: negative for weight gain. negative for weight loss. negative for changes in sleep.   Ears: negative for fullness. negative for hearing loss. negative for dizziness.   Nose: negative for snoring.negative for changes in smell. negative for drainage.   Throat: negative for hoarseness. negative for sore throat. negative for trouble swallowing.   Lungs: negative for shortness of breath.negative for wheezing. negative for sputum production.   Cardiovascular: negative for chest pain. negative for swelling of ankles. negative for fast or irregular heartbeat.   Gastrointestinal: negative for nausea. negative for heartburn. negative for acid reflux.   Musculoskeletal: negative for joint pain. negative for joint stiffness. negative for joint swelling.   Neurologic: negative for seizures. negative for fainting. negative for weakness.   Psychiatric: negative for changes in mood. negative for anxiety.   Endocrine: negative for cold intolerance. negative for heat intolerance. negative for tremors.   Hematologic: negative for easy bruising. negative for easy bleeding.  Integumentary: negative for rash. negative for scaling. negative for nail changes.       Current Outpatient Prescriptions:      azelastine (ASTELIN)  0.1 % spray, Spray 2 sprays into both nostrils 2 times daily, Disp: 1 Bottle, Rfl: 3     cetirizine (ZYRTEC) 10 MG tablet, Take 1 tablet (10 mg) by mouth daily, Disp: 30 tablet, Rfl: 3     EPINEPHrine (EPIPEN/ADRENACLICK/OR ANY BX GENERIC EQUIV) 0.3 MG/0.3ML injection 2-pack, Inject 0.3 mLs (0.3 mg) into the muscle as needed for anaphylaxis, Disp: 0.6 mL, Rfl: 0     fluticasone (FLONASE) 50 MCG/ACT spray, Spray 2 sprays into both nostrils 2 times daily Office visit for further refills, Disp: 1 Bottle, Rfl: 0     montelukast (SINGULAIR) 10 MG tablet, Take 1 tablet (10 mg) by mouth At Bedtime Office visit for further refills., Disp: 30 tablet, Rfl: 0     ORDER FOR ALLERGEN IMMUNOTHERAPY, Dust Mites DF 30,000AU/mL, HS  0.3 ml Dust Mites DP. 30,000 AU/mL, HS  0.3 ml  Jose, White 1:20 w/v, HS  1.0 ml Lamb's Quarters 1:20 w/v, HS 1.0 ml Diluent: HSA qs to 5ml, Disp: 5 mL, Rfl: prn     fluticasone (FLONASE) 50 MCG/ACT spray, Spray 2 sprays into both nostrils daily, Disp: , Rfl:      Montelukast Sodium (SINGULAIR PO), , Disp: , Rfl:     There is no immunization history on file for this patient.  No Known Allergies      EXAM:   Constitutional:  Appears well-developed and well-nourished. No distress.   HEENT:   Head: Normocephalic.   Cardiovascular: Normal rate, regular rhythm and normal heart sounds. Exam reveals no gallop and no friction rub.   No murmur heard.  Respiratory: Effort normal and breath sounds normal. No respiratory distress. No wheezes. No rales.   Musculoskeletal: Normal range of motion.   Neuro: Oriented to person, place, and time.  Psychiatric: Normal mood and affect.     Nursing note and vitals reviewed.      WORKUP:  Cluster immunotherapy   The patient received blue 0.2, blue 0.4, and yellow 0.05. Each dose was  by 30 minutes. Full report will be scanned into electronic medical record. The patient was in office for over 1.5 hours.    ASSESSMENT/PLAN:  Problem List Items Addressed This Visit         Respiratory    Allergic rhinitis due to dust mite     Perennial nasal and ocular symptoms for the last 11 years. Flonase has been used and non-beneficial. Zyrtec, montelukast and other oral antihistamines have been non-beneficial. Using refresh eyedrops.  Using that a netti pot and this has been beneficial.      Tolerated cluster immunotherapy today.       - Flonase 50mcg 2 sprays/nostril q day.   - Azelastine 2 sprays/nostril twice daily as needed.   - Cetirizine (Zyrtec) 10mg by mouth daily.   - On allergen immunotherapy and tolerating. Continue cluster immunotherapy.          Relevant Orders    RAPID DESENSITIZATION (Completed)    Chronic seasonal allergic rhinitis due to pollen - Primary    Relevant Orders    RAPID DESENSITIZATION (Completed)          Chart documentation with Dragon Voice recognition Software. Although reviewed after completion, some words and grammatical errors may remain.    Ray Mcleod,    Allergy/Immunology  [facility] [place]

## 2018-05-07 NOTE — PATIENT INSTRUCTIONS
Allergy Staff Appt Hours Shot Hours Locations    Physician     Ray Mcleod DO       Support Staff     Aparna WESTFALL, RN      Fariba WESTFALL, [facility]  Monday:                      [place] 8-7     Tuesday:         [place] 8-5     Wednesday:        [place]: 7-5     Friday:        [place] 7-5   [place]        Monday: 9-5:50        Wednesday: 2-5:50        Friday: 7-12:50     [place]        Tuesday: 7-10:50        Thursday: 1:30-6:30        Friday: 8:00-3:50     [place] Monday: 7:10-4:50        Tuesday: 12:30-6:30        Thursday: 7-11:50 [facility]  [address] [place] [address]  Appt Line: [phone]  Allergy RN (Monday):  [phone]    [facility]  [address] [place] [address]  Appt Line: [phone]  Allergy RN (Tues & Wed):  [phone]    [facility]  [address] [place] [address]  Appt Line: [phone]  Allergy RN (Friday):  [phone]       Important Scheduling Information  Aspirin Desensitization: Appt will last 2 clinic days. Please call the Allergy RN line for your clinic to schedule. Discontinue antihistamines 7 days prior to the appointment.     Food Challenges: Appt will last 3-4 hours. Please call the Allergy RN line for your clinic to schedule. Discontinue antihistamines 7 days prior to the appointment.     Penicillin Testing: Appt will last 2-3 hours. Please call the Allergy RN line for your clinic to schedule. Discontinue antihistamines 7 days prior to the appointment.     Skin Testing: Appt will about 40 minutes. Call the appointment line for your clinic to schedule. Discontinue antihistamines 7 days prior to the appointment.     Venom Testing: Appt will last 2-3 hours. Please call the Allergy RN line for your clinic to schedule. Discontinue antihistamines 7 days prior to the appointment.     Thank you for trusting us with your Allergy, Asthma, and Immunology care. Please feel free to contact us with any questions or concerns you may  have.

## 2018-05-14 ENCOUNTER — OFFICE VISIT (OUTPATIENT)
Dept: ALLERGY | Facility: CLINIC | Age: 38
End: 2018-05-14
Payer: COMMERCIAL

## 2018-05-14 VITALS
TEMPERATURE: 97.8 F | OXYGEN SATURATION: 98 % | WEIGHT: 159 LBS | BODY MASS INDEX: 25.66 KG/M2 | HEART RATE: 68 BPM | SYSTOLIC BLOOD PRESSURE: 115 MMHG | DIASTOLIC BLOOD PRESSURE: 72 MMHG

## 2018-05-14 DIAGNOSIS — J30.1 CHRONIC SEASONAL ALLERGIC RHINITIS DUE TO POLLEN: Primary | ICD-10-CM

## 2018-05-14 DIAGNOSIS — J30.89 ALLERGIC RHINITIS DUE TO DUST MITE: ICD-10-CM

## 2018-05-14 PROCEDURE — 95180 RAPID DESENSITIZATION: CPT | Performed by: ALLERGY & IMMUNOLOGY

## 2018-05-14 PROCEDURE — 99207 ZZC DROP WITH A PROCEDURE: CPT | Mod: 25 | Performed by: ALLERGY & IMMUNOLOGY

## 2018-05-14 NOTE — PATIENT INSTRUCTIONS
Allergy Staff Appt Hours Shot Hours Locations    Physician     Ray Mcleod DO       Support Staff     Aparna WESTFALL, RN      Fariba WESTFALL, [facility]  Monday:                      [place] 8-7     Tuesday:         [place] 8-5     Wednesday:        [place]: 7-5     Friday:        [place] 7-5   [place]        Monday: 9-5:50        Wednesday: 2-5:50        Friday: 7-12:50     [place]        Tuesday: 7-10:50        Thursday: 1:30-6:30        Friday: 8:00-3:50     [place] Monday: 7:10-4:50        Tuesday: 12:30-6:30        Thursday: 7-11:50 [facility]  [address] [place] [address]  Appt Line: [phone]  Allergy RN (Monday):  [phone]    [facility]  [address] [place] [address]  Appt Line: [phone]  Allergy RN (Tues & Wed):  [phone]    [facility]  [address] [place] [address]  Appt Line: [phone]  Allergy RN (Friday):  [phone]       Important Scheduling Information  Aspirin Desensitization: Appt will last 2 clinic days. Please call the Allergy RN line for your clinic to schedule. Discontinue antihistamines 7 days prior to the appointment.     Food Challenges: Appt will last 3-4 hours. Please call the Allergy RN line for your clinic to schedule. Discontinue antihistamines 7 days prior to the appointment.     Penicillin Testing: Appt will last 2-3 hours. Please call the Allergy RN line for your clinic to schedule. Discontinue antihistamines 7 days prior to the appointment.     Skin Testing: Appt will about 40 minutes. Call the appointment line for your clinic to schedule. Discontinue antihistamines 7 days prior to the appointment.     Venom Testing: Appt will last 2-3 hours. Please call the Allergy RN line for your clinic to schedule. Discontinue antihistamines 7 days prior to the appointment.     Thank you for trusting us with your Allergy, Asthma, and Immunology care. Please feel free to contact us with any questions or concerns you may  have.

## 2018-05-14 NOTE — MR AVS SNAPSHOT
After Visit Summary   5/14/2018    Roxanna Tao    MRN: [id number]           Patient Information     Date Of Birth          1980        Visit Information        Provider Department      5/14/2018 4:20 PM Ray Mcleod DO [facility]        Today's Diagnoses     Chronic seasonal allergic rhinitis due to pollen    -  1    Allergic rhinitis due to dust mite          Care Instructions    Allergy Staff Appt Hours Shot Hours Locations    Physician     Ray Mcleod DO       Support Staff     Aparna WESTFALL RN      Fariba WESTFALL, [facility]  Monday:                      [place] 8-7     Tuesday:         [place] 8-5     Wednesday:        [place]: 7-5     Friday:        [place] 7-5   [place]        Monday: 9-5:50        Wednesday: 2-5:50        Friday: 7-12:50     [place]        Tuesday: 7-10:50        Thursday: 1:30-6:30        Friday: 8:00-3:50     [place]        Monday: 7:10-4:50        Tuesday: 12:30-6:30        Thursday: 7-11:50 [facility]  [address] [place] [address]  Appt Line: [phone]  Allergy RN (Monday):  [phone]    [facility]  [address] [place] [address]  Appt Line: [phone]  Allergy RN (Tues & Wed):  [phone]    [facility]  [address] [place] [address]  Appt Line: [phone]  Allergy RN (Friday):  [phone]       Important Scheduling Information  Aspirin Desensitization: Appt will last 2 clinic days. Please call the Allergy RN line for your clinic to schedule. Discontinue antihistamines 7 days prior to the appointment.     Food Challenges: Appt will last 3-4 hours. Please call the Allergy RN line for your clinic to schedule. Discontinue antihistamines 7 days prior to the appointment.     Penicillin Testing: Appt will last 2-3 hours. Please call the Allergy RN line for your clinic to schedule. Discontinue antihistamines 7 days prior to the appointment.     Skin Testing: Appt will about 40 minutes. Call  the appointment line for your clinic to schedule. Discontinue antihistamines 7 days prior to the appointment.     Venom Testing: Appt will last 2-3 hours. Please call the Allergy RN line for your clinic to schedule. Discontinue antihistamines 7 days prior to the appointment.     Thank you for trusting us with your Allergy, Asthma, and Immunology care. Please feel free to contact us with any questions or concerns you may have.                Follow-ups after your visit        Your next 10 appointments already scheduled     May 21, 2018  4:20 PM CDT   Return Visit with Ray Mcleod DO   [facility] [place] ([facility] [place])    [address]   [place] [address]   [phone]            Jun 01, 2018  7:30 AM CDT   Nurse Only with AN ALLERGY SHOTS   [facility] [place] ([facility] [place])    [address] [facility]  [place] [address]   [phone]            Jun 04, 2018  4:20 PM CDT   Return Visit with Ray Mcleod DO   [facility] [place] ([facility] [place])    [address] [facility]  [place] [address]   [phone]            Jun 11, 2018  4:20 PM CDT   Return Visit with Ray Mcleod DO   [facility] [place] ([facility] [place])    [address] [facility]  [place] [address]   [phone]            Jun 18, 2018  4:20 PM CDT   Return Visit with Ray Mcleod DO   [facility] [place] ([facility] [place])    [address] [facility]  [place] [address]   [phone]              Who to contact     If you have questions or need follow up information about today's clinic visit or your schedule please contact [facility] directly at [phone].  Normal or non-critical lab and imaging results will be communicated to you by MyChart, letter or phone within 4 business days after the clinic has received the results. If you do not hear from us within 7 days, please contact the clinic through MyChart or phone. If you  "have a critical or abnormal lab result, we will notify you by phone as soon as possible.  Submit refill requests through [employer] or call your pharmacy and they will forward the refill request to us. Please allow 3 business days for your refill to be completed.          Additional Information About Your Visit        [facility] Information     [employer] lets you send messages to your doctor, view your test results, renew your prescriptions, schedule appointments and more. To sign up, go to [email or web address] . Click on \"Log in\" on the left side of the screen, which will take you to the Welcome page. Then click on \"Sign up Now\" on the right side of the page.     You will be asked to enter the access code listed below, as well as some personal information. Please follow the directions to create your username and password.     Your access code is: [email or web address]  Expires: 2018  6:26 PM     Your access code will  in 90 days. If you need help or a new code, please call your [facility] or [phone].        Care EveryWhere ID     This is your Care EveryWhere ID. This could be used by other organizations to access your [facility] medical records  [id number]        Your Vitals Were     Pulse Temperature Pulse Oximetry BMI (Body Mass Index)          68 97.8  F (36.6  C) (Oral) 98% 25.66 kg/m2         Blood Pressure from Last 3 Encounters:   18 115/72   18 114/77   18 121/78    Weight from Last 3 Encounters:   18 72.1 kg (159 lb)   18 72.6 kg (160 lb)   18 72.5 kg (159 lb 12.8 oz)              We Performed the Following     RAPID DESENSITIZATION        Primary Care Provider Office Phone # Fax #    [facility] [phone] [address] [facility] [address]        Equal Access to Services     RICHARD GHOSH : Lilliana mina Sopari, waaxda luqadaha, qaybta kaalmaviktor chowdhury. So [facility] " [phone].    ATENCIÓN: Si ayala olvera, tiene a mc disposición servicios gratuitos de asistencia lingüística. Laura pollock [phone].    We comply with applicable federal civil rights laws and [place] laws. We do not discriminate on the basis of race, color, national origin, age, disability, sex, sexual orientation, or gender identity.            Thank you!     Thank you for choosing [facility]  for your care. Our goal is always to provide you with excellent care. Hearing back from our patients is one way we can continue to improve our services. Please take a few minutes to complete the written survey that you may receive in the mail after your visit with us. Thank you!             Your Updated Medication List - Protect others around you: Learn how to safely use, store and throw away your medicines at www.disposemymeds.org.          This list is accurate as of 5/14/18  6:20 PM.  Always use your most recent med list.                   Brand Name Dispense Instructions for use Diagnosis    ALLERGEN IMMUNOTHERAPY PRESCRIPTION     5 mL    Dust Mites DF 30,000AU/mL, HS  0.3 ml Dust Mites DP. 30,000 AU/mL, HS  0.3 ml  Jose, White 1:20 w/v, HS  1.0 ml Lamb's Quarters 1:20 w/v, HS 1.0 ml Diluent: HSA qs to 5ml    Allergic rhinitis due to dust mite       azelastine 0.1 % spray    ASTELIN    1 Bottle    Spray 2 sprays into both nostrils 2 times daily    Chronic seasonal allergic rhinitis due to pollen       cetirizine 10 MG tablet    zyrTEC    30 tablet    Take 1 tablet (10 mg) by mouth daily    Chronic seasonal allergic rhinitis due to pollen       EPINEPHrine 0.3 MG/0.3ML injection 2-pack    EPIPEN/ADRENACLICK/or ANY BX GENERIC EQUIV    0.6 mL    Inject 0.3 mLs (0.3 mg) into the muscle as needed for anaphylaxis    Need for desensitization to allergens       * fluticasone 50 MCG/ACT spray    FLONASE     Spray 2 sprays into both nostrils daily        * fluticasone 50 MCG/ACT spray    FLONASE    1 Bottle     Spray 2 sprays into both nostrils 2 times daily Office visit for further refills    Chronic seasonal allergic rhinitis due to pollen       * SINGULAIR PO           * montelukast 10 MG tablet    SINGULAIR    30 tablet    Take 1 tablet (10 mg) by mouth At Bedtime Office visit for further refills.    Chronic seasonal allergic rhinitis due to pollen       * Notice:  This list has 4 medication(s) that are the same as other medications prescribed for you. Read the directions carefully, and ask your doctor or other care provider to review them with you.

## 2018-05-14 NOTE — PROGRESS NOTES
Roxanna Tao is a 38 year old  male with previous medical history significant for allergic rhinitis who returns for a follow up visit. Patient presents today for cluster immunotherapy. The patient is currently in a good state of health. No recent fevers, chills, cough, wheezing, shortness of breath, skin rash, angioedema, nausea, vomiting or diarrhea. The risks and benefits were discussed and the patient/patient's family wishes to proceed. The consent was signed.    History reviewed. No pertinent past medical history.  History reviewed. No pertinent family history.  History reviewed. No pertinent surgical history.    REVIEW OF SYSTEMS:  General: negative for weight gain. negative for weight loss. negative for changes in sleep.   Ears: negative for fullness. negative for hearing loss. negative for dizziness.   Nose: negative for snoring.negative for changes in smell. negative for drainage.   Throat: negative for hoarseness. negative for sore throat. negative for trouble swallowing.   Lungs: negative for shortness of breath.negative for wheezing. negative for sputum production.   Cardiovascular: negative for chest pain. negative for swelling of ankles. negative for fast or irregular heartbeat.   Gastrointestinal: negative for nausea. negative for heartburn. negative for acid reflux.   Musculoskeletal: negative for joint pain. negative for joint stiffness. negative for joint swelling.   Neurologic: negative for seizures. negative for fainting. negative for weakness.   Psychiatric: negative for changes in mood. negative for anxiety.   Endocrine: negative for cold intolerance. negative for heat intolerance. negative for tremors.   Hematologic: negative for easy bruising. negative for easy bleeding.  Integumentary: negative for rash. negative for scaling. negative for nail changes.       Current Outpatient Prescriptions:      azelastine (ASTELIN) 0.1 % spray, Spray 2 sprays into both nostrils 2 times daily,  Disp: 1 Bottle, Rfl: 3     cetirizine (ZYRTEC) 10 MG tablet, Take 1 tablet (10 mg) by mouth daily, Disp: 30 tablet, Rfl: 3     EPINEPHrine (EPIPEN/ADRENACLICK/OR ANY BX GENERIC EQUIV) 0.3 MG/0.3ML injection 2-pack, Inject 0.3 mLs (0.3 mg) into the muscle as needed for anaphylaxis, Disp: 0.6 mL, Rfl: 0     fluticasone (FLONASE) 50 MCG/ACT spray, Spray 2 sprays into both nostrils daily, Disp: , Rfl:      fluticasone (FLONASE) 50 MCG/ACT spray, Spray 2 sprays into both nostrils 2 times daily Office visit for further refills, Disp: 1 Bottle, Rfl: 0     montelukast (SINGULAIR) 10 MG tablet, Take 1 tablet (10 mg) by mouth At Bedtime Office visit for further refills., Disp: 30 tablet, Rfl: 0     Montelukast Sodium (SINGULAIR PO), , Disp: , Rfl:      ORDER FOR ALLERGEN IMMUNOTHERAPY, Dust Mites DF 30,000AU/mL, HS  0.3 ml Dust Mites DP. 30,000 AU/mL, HS  0.3 ml  Jose, White 1:20 w/v, HS  1.0 ml Lamb's Quarters 1:20 w/v, HS 1.0 ml Diluent: HSA qs to 5ml, Disp: 5 mL, Rfl: prn    There is no immunization history on file for this patient.  No Known Allergies      EXAM:   Constitutional:  Appears well-developed and well-nourished. No distress.   HEENT:   Head: Normocephalic.   Mouth/Throat:   White drainage of posterior oropharynx.   Boggy nasal tissue.    Eyes: Conjunctivae are non-erythematous   Cardiovascular: Normal rate, regular rhythm and normal heart sounds. Exam reveals no gallop and no friction rub.   No murmur heard.  Respiratory: Effort normal and breath sounds normal. No respiratory distress. No wheezes. No rales.   Musculoskeletal: Normal range of motion.   Neuro: Oriented to person, place, and time.  Psychiatric: Normal mood and affect.     Nursing note and vitals reviewed.      WORKUP:   Cluster immunotherapy   The patient received yellow 0.1, yellow 0.15, and yellow 0.25. Each dose was  by 30 minutes. Full report will be scanned into electronic medical record. The patient was in office for over 1.5  hours.    ASSESSMENT/PLAN:  Problem List Items Addressed This Visit        Respiratory    Allergic rhinitis due to dust mite     Perennial nasal and ocular symptoms for the last 11 years. Flonase has been used and non-beneficial. Zyrtec, montelukast and other oral antihistamines have been non-beneficial. Using refresh eyedrops.  Using that a netti pot and this has been beneficial.       Tolerated cluster immunotherapy today.       - Flonase 50mcg 2 sprays/nostril q day.   - Azelastine 2 sprays/nostril twice daily as needed.   - Cetirizine (Zyrtec) 10mg by mouth daily.   - On allergen immunotherapy and tolerating. Continue cluster immunotherapy.          Relevant Orders    RAPID DESENSITIZATION (Completed)    Chronic seasonal allergic rhinitis due to pollen - Primary    Relevant Orders    RAPID DESENSITIZATION (Completed)          Chart documentation with Dragon Voice recognition Software. Although reviewed after completion, some words and grammatical errors may remain.    Ray Mcleod,    Allergy/Immunology  [facility], [place] and [place], [place]

## 2018-05-14 NOTE — LETTER
5/14/2018         RE: Roxanna Tao  [address] [facility] [address]        Dear Colleague,    Thank you for referring your patient, Roxanna Tao, to the [facility]. Please see a copy of my visit note below.    Roxanna Tao is a 38 year old  male with previous medical history significant for allergic rhinitis who returns for a follow up visit. Patient presents today for cluster immunotherapy. The patient is currently in a good state of health. No recent fevers, chills, cough, wheezing, shortness of breath, skin rash, angioedema, nausea, vomiting or diarrhea. The risks and benefits were discussed and the patient/patient's family wishes to proceed. The consent was signed.    History reviewed. No pertinent past medical history.  History reviewed. No pertinent family history.  History reviewed. No pertinent surgical history.    REVIEW OF SYSTEMS:  General: negative for weight gain. negative for weight loss. negative for changes in sleep.   Ears: negative for fullness. negative for hearing loss. negative for dizziness.   Nose: negative for snoring.negative for changes in smell. negative for drainage.   Throat: negative for hoarseness. negative for sore throat. negative for trouble swallowing.   Lungs: negative for shortness of breath.negative for wheezing. negative for sputum production.   Cardiovascular: negative for chest pain. negative for swelling of ankles. negative for fast or irregular heartbeat.   Gastrointestinal: negative for nausea. negative for heartburn. negative for acid reflux.   Musculoskeletal: negative for joint pain. negative for joint stiffness. negative for joint swelling.   Neurologic: negative for seizures. negative for fainting. negative for weakness.   Psychiatric: negative for changes in mood. negative for anxiety.   Endocrine: negative for cold intolerance. negative for heat intolerance. negative for tremors.   Hematologic: negative for easy  bruising. negative for easy bleeding.  Integumentary: negative for rash. negative for scaling. negative for nail changes.       Current Outpatient Prescriptions:      azelastine (ASTELIN) 0.1 % spray, Spray 2 sprays into both nostrils 2 times daily, Disp: 1 Bottle, Rfl: 3     cetirizine (ZYRTEC) 10 MG tablet, Take 1 tablet (10 mg) by mouth daily, Disp: 30 tablet, Rfl: 3     EPINEPHrine (EPIPEN/ADRENACLICK/OR ANY BX GENERIC EQUIV) 0.3 MG/0.3ML injection 2-pack, Inject 0.3 mLs (0.3 mg) into the muscle as needed for anaphylaxis, Disp: 0.6 mL, Rfl: 0     fluticasone (FLONASE) 50 MCG/ACT spray, Spray 2 sprays into both nostrils daily, Disp: , Rfl:      fluticasone (FLONASE) 50 MCG/ACT spray, Spray 2 sprays into both nostrils 2 times daily Office visit for further refills, Disp: 1 Bottle, Rfl: 0     montelukast (SINGULAIR) 10 MG tablet, Take 1 tablet (10 mg) by mouth At Bedtime Office visit for further refills., Disp: 30 tablet, Rfl: 0     Montelukast Sodium (SINGULAIR PO), , Disp: , Rfl:      ORDER FOR ALLERGEN IMMUNOTHERAPY, Dust Mites DF 30,000AU/mL, HS  0.3 ml Dust Mites DP. 30,000 AU/mL, HS  0.3 ml  Jose, White 1:20 w/v, HS  1.0 ml Lamb's Quarters 1:20 w/v, HS 1.0 ml Diluent: HSA qs to 5ml, Disp: 5 mL, Rfl: prn    There is no immunization history on file for this patient.  No Known Allergies      EXAM:   Constitutional:  Appears well-developed and well-nourished. No distress.   HEENT:   Head: Normocephalic.   Mouth/Throat:   White drainage of posterior oropharynx.   Boggy nasal tissue.    Eyes: Conjunctivae are non-erythematous   Cardiovascular: Normal rate, regular rhythm and normal heart sounds. Exam reveals no gallop and no friction rub.   No murmur heard.  Respiratory: Effort normal and breath sounds normal. No respiratory distress. No wheezes. No rales.   Musculoskeletal: Normal range of motion.   Neuro: Oriented to person, place, and time.  Psychiatric: Normal mood and affect.     Nursing note and vitals  reviewed.      WORKUP:   Cluster immunotherapy   The patient received yellow 0.1, yellow 0.15, and yellow 0.25. Each dose was  by 30 minutes. Full report will be scanned into electronic medical record. The patient was in office for over 1.5 hours.    ASSESSMENT/PLAN:  Problem List Items Addressed This Visit        Respiratory    Allergic rhinitis due to dust mite     Perennial nasal and ocular symptoms for the last 11 years. Flonase has been used and non-beneficial. Zyrtec, montelukast and other oral antihistamines have been non-beneficial. Using refresh eyedrops.  Using that a netti pot and this has been beneficial.       Tolerated cluster immunotherapy today.       - Flonase 50mcg 2 sprays/nostril q day.   - Azelastine 2 sprays/nostril twice daily as needed.   - Cetirizine (Zyrtec) 10mg by mouth daily.   - On allergen immunotherapy and tolerating. Continue cluster immunotherapy.          Relevant Orders    RAPID DESENSITIZATION (Completed)    Chronic seasonal allergic rhinitis due to pollen - Primary    Relevant Orders    RAPID DESENSITIZATION (Completed)          Chart documentation with Dragon Voice recognition Software. Although reviewed after completion, some words and grammatical errors may remain.    Ray Mcleod,    Allergy/Immunology  [facility] [place] [facility] [place]      Again, thank you for allowing me to participate in the care of your patient.        Sincerely,        Ray Mcleod, DO

## 2018-05-21 ENCOUNTER — OFFICE VISIT (OUTPATIENT)
Dept: ALLERGY | Facility: CLINIC | Age: 38
End: 2018-05-21
Payer: COMMERCIAL

## 2018-05-21 VITALS
TEMPERATURE: 97.4 F | HEART RATE: 78 BPM | DIASTOLIC BLOOD PRESSURE: 79 MMHG | BODY MASS INDEX: 25.18 KG/M2 | OXYGEN SATURATION: 99 % | WEIGHT: 156 LBS | SYSTOLIC BLOOD PRESSURE: 114 MMHG

## 2018-05-21 DIAGNOSIS — J30.1 CHRONIC SEASONAL ALLERGIC RHINITIS DUE TO POLLEN: Primary | ICD-10-CM

## 2018-05-21 DIAGNOSIS — J30.89 ALLERGIC RHINITIS DUE TO DUST MITE: ICD-10-CM

## 2018-05-21 PROCEDURE — 95180 RAPID DESENSITIZATION: CPT | Performed by: ALLERGY & IMMUNOLOGY

## 2018-05-21 PROCEDURE — 99207 ZZC DROP WITH A PROCEDURE: CPT | Mod: 25 | Performed by: ALLERGY & IMMUNOLOGY

## 2018-05-21 NOTE — LETTER
5/21/2018         RE: Roxanna Tao  [address] [facility] [place] [address]        Dear Colleague,    Thank you for referring your patient, Roxanna Tao, to the [facility]. Please see a copy of my visit note below.    Roxanna Tao is a 38 year old  male with previous medical history significant for allergic rhinitis who returns for a follow up visit. Patient presents today for cluster immunotherapy. The patient is currently in a good state of health. No recent fevers, chills, cough, wheezing, shortness of breath, skin rash, angioedema, nausea, vomiting or diarrhea. The risks and benefits were discussed and the patient/patient's family wishes to proceed. The consent was signed.      History reviewed. No pertinent past medical history.  History reviewed. No pertinent family history.  History reviewed. No pertinent surgical history.    REVIEW OF SYSTEMS:  General: negative for weight gain. negative for weight loss. negative for changes in sleep.   Ears: negative for fullness. negative for hearing loss. negative for dizziness.   Nose: negative for snoring.negative for changes in smell. negative for drainage.   Throat: negative for hoarseness. negative for sore throat. negative for trouble swallowing.   Lungs: negative for shortness of breath.negative for wheezing. negative for sputum production.   Cardiovascular: negative for chest pain. negative for swelling of ankles. negative for fast or irregular heartbeat.   Gastrointestinal: negative for nausea. negative for heartburn. negative for acid reflux.   Musculoskeletal: negative for joint pain. negative for joint stiffness. negative for joint swelling.   Neurologic: negative for seizures. negative for fainting. negative for weakness.   Psychiatric: negative for changes in mood. negative for anxiety.   Endocrine: negative for cold intolerance. negative for heat intolerance. negative for tremors.   Hematologic: negative for easy  bruising. negative for easy bleeding.  Integumentary: negative for rash. negative for scaling. negative for nail changes.       Current Outpatient Prescriptions:      azelastine (ASTELIN) 0.1 % spray, Spray 2 sprays into both nostrils 2 times daily, Disp: 1 Bottle, Rfl: 3     cetirizine (ZYRTEC) 10 MG tablet, Take 1 tablet (10 mg) by mouth daily, Disp: 30 tablet, Rfl: 3     EPINEPHrine (EPIPEN/ADRENACLICK/OR ANY BX GENERIC EQUIV) 0.3 MG/0.3ML injection 2-pack, Inject 0.3 mLs (0.3 mg) into the muscle as needed for anaphylaxis, Disp: 0.6 mL, Rfl: 0     fluticasone (FLONASE) 50 MCG/ACT spray, Spray 2 sprays into both nostrils daily, Disp: , Rfl:      fluticasone (FLONASE) 50 MCG/ACT spray, Spray 2 sprays into both nostrils 2 times daily Office visit for further refills, Disp: 1 Bottle, Rfl: 0     montelukast (SINGULAIR) 10 MG tablet, Take 1 tablet (10 mg) by mouth At Bedtime Office visit for further refills., Disp: 30 tablet, Rfl: 0     Montelukast Sodium (SINGULAIR PO), , Disp: , Rfl:      ORDER FOR ALLERGEN IMMUNOTHERAPY, Dust Mites DF 30,000AU/mL, HS  0.3 ml Dust Mites DP. 30,000 AU/mL, HS  0.3 ml  Jose, White 1:20 w/v, HS  1.0 ml Lamb's Quarters 1:20 w/v, HS 1.0 ml Diluent: HSA qs to 5ml, Disp: 5 mL, Rfl: prn    There is no immunization history on file for this patient.  No Known Allergies      EXAM:   Constitutional:  Appears well-developed and well-nourished. No distress.   HEENT:   Head: Normocephalic.   Mouth/Throat: No oropharyngeal exudate present.   No cobblestoning of posterior oropharynx.   Nasal tissue pink and normal appearing.  No rhinorrhea noted.    Eyes: Conjunctivae are non-erythematous   Cardiovascular: Normal rate, regular rhythm and normal heart sounds. Exam reveals no gallop and no friction rub.   No murmur heard.  Respiratory: Effort normal and breath sounds normal. No respiratory distress. No wheezes. No rales.   Musculoskeletal: Normal range of motion.   Neuro: Oriented to person, place, and  time.  Skin: Skin is warm and dry. No rash noted.   Psychiatric: Normal mood and affect.     Nursing note and vitals reviewed.      WORKUP:   Cluster immunotherapy   The patient received yellow 0.35, yellow 0.5. Each dose was  by 30 minutes. Full report will be scanned into electronic medical record. The patient was in office for over 1.0 hours.    ASSESSMENT/PLAN:  Problem List Items Addressed This Visit        Respiratory    Allergic rhinitis due to dust mite     Perennial nasal and ocular symptoms for the last 11 years. Flonase has been used and non-beneficial. Zyrtec, montelukast and other oral antihistamines have been non-beneficial. Using refresh eyedrops.  Using that a netti pot and this has been beneficial.       Tolerated cluster immunotherapy today.       - Flonase 50mcg 2 sprays/nostril q day.   - Azelastine 2 sprays/nostril twice daily as needed.   - Cetirizine (Zyrtec) 10mg by mouth daily.   - On allergen immunotherapy and tolerating. Continue cluster immunotherapy.          Relevant Orders    RAPID DESENSITIZATION (Completed)    Chronic seasonal allergic rhinitis due to pollen - Primary    Relevant Orders    RAPID DESENSITIZATION (Completed)          Chart documentation with Dragon Voice recognition Software. Although reviewed after completion, some words and grammatical errors may remain.    Ray Mcleod,    Allergy/Immunology  [facility] [place] [facility]      Again, thank you for allowing me to participate in the care of your patient.        Sincerely,        Ray Mcleod, DO

## 2018-05-21 NOTE — MR AVS SNAPSHOT
After Visit Summary   5/21/2018    Roxanna Tao    MRN: [id number]           Patient Information     Date Of Birth          1980        Visit Information        Provider Department      5/21/2018 4:20 PM Ray Mcleod DO [facility]        Today's Diagnoses     Chronic seasonal allergic rhinitis due to pollen    -  1    Allergic rhinitis due to dust mite          Care Instructions    Allergy Staff Appt Hours Shot Hours Locations    Physician     Ray Mcleod DO       Support Staff     Aparna WESTFALL RN      Fariba WESTFALL, [facility]  Monday:                      [place] 8-7     Tuesday:         [place] 8-5     Wednesday:        [place]: 7-5     Friday:        [place] 7-5   [place]        Monday: 9-5:50        Wednesday: 2-5:50        Friday: 7-12:50     [place]        Tuesday: 7-10:50        Thursday: 1:30-6:30        Friday: 8:00-3:50     [place]        Monday: 7:10-4:50        Tuesday: 12:30-6:30        Thursday: 7-11:50 [facility]  [address] [place] [address]  Appt Line: [phone]  Allergy RN (Monday):  [phone]    [facility]  [address] [place] [address]  Appt Line: [phone]  Allergy RN (Tues & Wed):  [phone]    [facility]  [address] [place] [address]  Appt Line: [phone]  Allergy RN (Friday):  [phone]       Important Scheduling Information  Aspirin Desensitization: Appt will last 2 clinic days. Please call the Allergy RN line for your clinic to schedule. Discontinue antihistamines 7 days prior to the appointment.     Food Challenges: Appt will last 3-4 hours. Please call the Allergy RN line for your clinic to schedule. Discontinue antihistamines 7 days prior to the appointment.     Penicillin Testing: Appt will last 2-3 hours. Please call the Allergy RN line for your clinic to schedule. Discontinue antihistamines 7 days prior to the appointment.     Skin Testing: Appt will about 40 minutes. Call  the appointment line for your clinic to schedule. Discontinue antihistamines 7 days prior to the appointment.     Venom Testing: Appt will last 2-3 hours. Please call the Allergy RN line for your clinic to schedule. Discontinue antihistamines 7 days prior to the appointment.     Thank you for trusting us with your Allergy, Asthma, and Immunology care. Please feel free to contact us with any questions or concerns you may have.                Follow-ups after your visit        Your next 10 appointments already scheduled     Jun 01, 2018  7:30 AM CDT   Nurse Only with AN ALLERGY SHOTS   [facility]    [address] [facility] [address]   [phone]            Jun 04, 2018  4:20 PM CDT   Return Visit with Ray Mcleod DO   [facility]    [address] [facility] [address]   [phone]            Jun 11, 2018  4:20 PM CDT   Return Visit with Ray Mcleod DO   [facility]    [address] [facility] [address] [phone]            Jun 18, 2018  4:40 PM CDT   Return Visit with Ray Mcleod DO   [facility]    [address] [facility] [address] [phone]              Who to contact     If you have questions or need follow up information about today's clinic visit or your schedule please contact [facility] directly at [phone].  Normal or non-critical lab and imaging results will be communicated to you by MyChart, letter or phone within 4 business days after the clinic has received the results. If you do not hear from us within 7 days, please contact the clinic through MyChart or phone. If you have a critical or abnormal lab result, we will notify you by phone as soon as possible.  Submit refill requests through [place] or call your pharmacy and they will forward the refill request to  "us. Please allow 3 business days for your refill to be completed.          Additional Information About Your Visit        Combined [employer] Information     [employer] lets you send messages to your doctor, view your test results, renew your prescriptions, schedule appointments and more. To sign up, go to [email or web address] . Click on \"Log in\" on the left side of the screen, which will take you to the Welcome page. Then click on \"Sign up Now\" on the right side of the page.     You will be asked to enter the access code listed below, as well as some personal information. Please follow the directions to create your username and password.     Your access code is: [email or web address]  Expires: 2018  6:26 PM     Your access code will  in 90 days. If you need help or a new code, please call your [place] clinic or [phone].        Care EveryWhere ID     This is your [facility] EveryWhere ID. This could be used by other organizations to access your [facility] medical records  [id number]        Your Vitals Were     Pulse Temperature Pulse Oximetry BMI (Body Mass Index)          78 97.4  F (36.3  C) (Oral) 99% 25.18 kg/m2         Blood Pressure from Last 3 Encounters:   18 114/79   18 115/72   18 114/77    Weight from Last 3 Encounters:   18 70.8 kg (156 lb)   18 72.1 kg (159 lb)   18 72.6 kg (160 lb)              We Performed the Following     RAPID DESENSITIZATION        Primary Care Provider Office Phone # Fax #    [facility] [phone] [address] [facility] [address]        Equal Access to Services     RICHARD GHOSH AH: Lilliana Steen, alejandro garrett, qaryanta hannaalmada katelyn, viktor bennett. So [facility] [phone].    ATENCIÓN: Si habla español, tiene a mc disposición servicios gratuitos de asistencia lingüística. Llame al [phone].    We comply with applicable federal civil rights laws and [place] laws. We " do not discriminate on the basis of race, color, national origin, age, disability, sex, sexual orientation, or gender identity.            Thank you!     Thank you for choosing [facility]  [facility]. Our goal is always to provide you with excellent care. Hearing back from our patients is one way we can continue to improve our services. Please take a few minutes to complete the written survey that you may receive in the mail after your visit with us. Thank you!             Your Updated Medication List - Protect others around you: Learn how to safely use, store and throw away your medicines at www.disposemymeds.org.          This list is accurate as of 5/21/18  5:53 PM.  Always use your most recent med list.                   Brand Name Dispense Instructions for use Diagnosis    ALLERGEN IMMUNOTHERAPY PRESCRIPTION     5 mL    Dust Mites DF 30,000AU/mL, HS  0.3 ml Dust Mites DP. 30,000 AU/mL, HS  0.3 ml  Jose, White 1:20 w/v, HS  1.0 ml Lamb's Quarters 1:20 w/v, HS 1.0 ml Diluent: HSA qs to 5ml    Allergic rhinitis due to dust mite       azelastine 0.1 % spray    ASTELIN    1 Bottle    Spray 2 sprays into both nostrils 2 times daily    Chronic seasonal allergic rhinitis due to pollen       cetirizine 10 MG tablet    zyrTEC    30 tablet    Take 1 tablet (10 mg) by mouth daily    Chronic seasonal allergic rhinitis due to pollen       EPINEPHrine 0.3 MG/0.3ML injection 2-pack    EPIPEN/ADRENACLICK/or ANY BX GENERIC EQUIV    0.6 mL    Inject 0.3 mLs (0.3 mg) into the muscle as needed for anaphylaxis    Need for desensitization to allergens       * fluticasone 50 MCG/ACT spray    FLONASE     Spray 2 sprays into both nostrils daily        * fluticasone 50 MCG/ACT spray    FLONASE    1 Bottle    Spray 2 sprays into both nostrils 2 times daily Office visit for further refills    Chronic seasonal allergic rhinitis due to pollen       * SINGULAIR PO           * montelukast 10 MG tablet    SINGULAIR    30 tablet     Take 1 tablet (10 mg) by mouth At Bedtime Office visit for further refills.    Chronic seasonal allergic rhinitis due to pollen       * Notice:  This list has 4 medication(s) that are the same as other medications prescribed for you. Read the directions carefully, and ask your doctor or other care provider to review them with you.

## 2018-05-21 NOTE — PROGRESS NOTES
Roxanna Tao is a 38 year old  male with previous medical history significant for allergic rhinitis who returns for a follow up visit. Patient presents today for cluster immunotherapy. The patient is currently in a good state of health. No recent fevers, chills, cough, wheezing, shortness of breath, skin rash, angioedema, nausea, vomiting or diarrhea. The risks and benefits were discussed and the patient/patient's family wishes to proceed. The consent was signed.      History reviewed. No pertinent past medical history.  History reviewed. No pertinent family history.  History reviewed. No pertinent surgical history.    REVIEW OF SYSTEMS:  General: negative for weight gain. negative for weight loss. negative for changes in sleep.   Ears: negative for fullness. negative for hearing loss. negative for dizziness.   Nose: negative for snoring.negative for changes in smell. negative for drainage.   Throat: negative for hoarseness. negative for sore throat. negative for trouble swallowing.   Lungs: negative for shortness of breath.negative for wheezing. negative for sputum production.   Cardiovascular: negative for chest pain. negative for swelling of ankles. negative for fast or irregular heartbeat.   Gastrointestinal: negative for nausea. negative for heartburn. negative for acid reflux.   Musculoskeletal: negative for joint pain. negative for joint stiffness. negative for joint swelling.   Neurologic: negative for seizures. negative for fainting. negative for weakness.   Psychiatric: negative for changes in mood. negative for anxiety.   Endocrine: negative for cold intolerance. negative for heat intolerance. negative for tremors.   Hematologic: negative for easy bruising. negative for easy bleeding.  Integumentary: negative for rash. negative for scaling. negative for nail changes.       Current Outpatient Prescriptions:      azelastine (ASTELIN) 0.1 % spray, Spray 2 sprays into both nostrils 2 times daily,  Disp: 1 Bottle, Rfl: 3     cetirizine (ZYRTEC) 10 MG tablet, Take 1 tablet (10 mg) by mouth daily, Disp: 30 tablet, Rfl: 3     EPINEPHrine (EPIPEN/ADRENACLICK/OR ANY BX GENERIC EQUIV) 0.3 MG/0.3ML injection 2-pack, Inject 0.3 mLs (0.3 mg) into the muscle as needed for anaphylaxis, Disp: 0.6 mL, Rfl: 0     fluticasone (FLONASE) 50 MCG/ACT spray, Spray 2 sprays into both nostrils daily, Disp: , Rfl:      fluticasone (FLONASE) 50 MCG/ACT spray, Spray 2 sprays into both nostrils 2 times daily Office visit for further refills, Disp: 1 Bottle, Rfl: 0     montelukast (SINGULAIR) 10 MG tablet, Take 1 tablet (10 mg) by mouth At Bedtime Office visit for further refills., Disp: 30 tablet, Rfl: 0     Montelukast Sodium (SINGULAIR PO), , Disp: , Rfl:      ORDER FOR ALLERGEN IMMUNOTHERAPY, Dust Mites DF 30,000AU/mL, HS  0.3 ml Dust Mites DP. 30,000 AU/mL, HS  0.3 ml  Jose, White 1:20 w/v, HS  1.0 ml Lamb's Quarters 1:20 w/v, HS 1.0 ml Diluent: HSA qs to 5ml, Disp: 5 mL, Rfl: prn    There is no immunization history on file for this patient.  No Known Allergies      EXAM:   Constitutional:  Appears well-developed and well-nourished. No distress.   HEENT:   Head: Normocephalic.   Mouth/Throat: No oropharyngeal exudate present.   No cobblestoning of posterior oropharynx.   Nasal tissue pink and normal appearing.  No rhinorrhea noted.    Eyes: Conjunctivae are non-erythematous   Cardiovascular: Normal rate, regular rhythm and normal heart sounds. Exam reveals no gallop and no friction rub.   No murmur heard.  Respiratory: Effort normal and breath sounds normal. No respiratory distress. No wheezes. No rales.   Musculoskeletal: Normal range of motion.   Neuro: Oriented to person, place, and time.  Skin: Skin is warm and dry. No rash noted.   Psychiatric: Normal mood and affect.     Nursing note and vitals reviewed.      WORKUP:   Cluster immunotherapy   The patient received yellow 0.35, yellow 0.5. Each dose was  by 30 minutes.  Full report will be scanned into electronic medical record. The patient was in office for over 1.0 hours.    ASSESSMENT/PLAN:  Problem List Items Addressed This Visit        Respiratory    Allergic rhinitis due to dust mite     Perennial nasal and ocular symptoms for the last 11 years. Flonase has been used and non-beneficial. Zyrtec, montelukast and other oral antihistamines have been non-beneficial. Using refresh eyedrops.  Using that a netti pot and this has been beneficial.       Tolerated cluster immunotherapy today.       - Flonase 50mcg 2 sprays/nostril q day.   - Azelastine 2 sprays/nostril twice daily as needed.   - Cetirizine (Zyrtec) 10mg by mouth daily.   - On allergen immunotherapy and tolerating. Continue cluster immunotherapy.          Relevant Orders    RAPID DESENSITIZATION (Completed)    Chronic seasonal allergic rhinitis due to pollen - Primary    Relevant Orders    RAPID DESENSITIZATION (Completed)          Chart documentation with Dragon Voice recognition Software. Although reviewed after completion, some words and grammatical errors may remain.    Ray Mcleod,    Allergy/Immunology  [facility], [place] and [place], [place]

## 2018-05-21 NOTE — PATIENT INSTRUCTIONS
Allergy Staff Appt Hours Shot Hours Locations    Physician     Ray Mcleod DO       Support Staff     Aparna WESTFALL, RN      Fariba WESTFALL, [facility]  Monday:                      [place] 8-7     Tuesday:         [place] 8-5     Wednesday:        [place]: 7-5     Friday:        [place] 7-5   [place]        Monday: 9-5:50        Wednesday: 2-5:50        Friday: 7-12:50     [place]        Tuesday: 7-10:50        Thursday: 1:30-6:30        Friday: 8:00-3:50     [place] Monday: 7:10-4:50        Tuesday: 12:30-6:30        Thursday: 7-11:50 [facility]  [address] [place] [address]  Appt Line: [phone]  Allergy RN (Monday):  [phone]    [facility]  [address] [place] [address]  Appt Line: [phone]  Allergy RN (Tues & Wed):  [phone]    [facility]  [address] [place] [address]  Appt Line: [phone]  Allergy RN (Friday):  [phone]       Important Scheduling Information  Aspirin Desensitization: Appt will last 2 clinic days. Please call the Allergy RN line for your clinic to schedule. Discontinue antihistamines 7 days prior to the appointment.     Food Challenges: Appt will last 3-4 hours. Please call the Allergy RN line for your clinic to schedule. Discontinue antihistamines 7 days prior to the appointment.     Penicillin Testing: Appt will last 2-3 hours. Please call the Allergy RN line for your clinic to schedule. Discontinue antihistamines 7 days prior to the appointment.     Skin Testing: Appt will about 40 minutes. Call the appointment line for your clinic to schedule. Discontinue antihistamines 7 days prior to the appointment.     Venom Testing: Appt will last 2-3 hours. Please call the Allergy RN line for your clinic to schedule. Discontinue antihistamines 7 days prior to the appointment.     Thank you for trusting us with your Allergy, Asthma, and Immunology care. Please feel free to contact us with any questions or concerns you may  have.

## 2018-06-04 ENCOUNTER — OFFICE VISIT (OUTPATIENT)
Dept: ALLERGY | Facility: CLINIC | Age: 38
End: 2018-06-04
Payer: COMMERCIAL

## 2018-06-04 VITALS
RESPIRATION RATE: 16 BRPM | WEIGHT: 156.6 LBS | DIASTOLIC BLOOD PRESSURE: 85 MMHG | SYSTOLIC BLOOD PRESSURE: 127 MMHG | TEMPERATURE: 98.2 F | OXYGEN SATURATION: 98 % | HEART RATE: 92 BPM | BODY MASS INDEX: 25.28 KG/M2

## 2018-06-04 DIAGNOSIS — J30.89 ALLERGIC RHINITIS DUE TO DUST MITE: ICD-10-CM

## 2018-06-04 DIAGNOSIS — J30.1 CHRONIC SEASONAL ALLERGIC RHINITIS DUE TO POLLEN: Primary | ICD-10-CM

## 2018-06-04 PROCEDURE — 95180 RAPID DESENSITIZATION: CPT | Performed by: ALLERGY & IMMUNOLOGY

## 2018-06-04 RX ORDER — CETIRIZINE HYDROCHLORIDE 10 MG/1
10 TABLET ORAL DAILY
Qty: 30 TABLET | Refills: 11 | Status: SHIPPED | OUTPATIENT
Start: 2018-06-04 | End: 2019-05-31

## 2018-06-04 RX ORDER — MONTELUKAST SODIUM 10 MG/1
10 TABLET ORAL AT BEDTIME
Qty: 30 TABLET | Refills: 11 | Status: SHIPPED | OUTPATIENT
Start: 2018-06-04 | End: 2019-05-31

## 2018-06-04 NOTE — MR AVS SNAPSHOT
After Visit Summary   6/4/2018    Roxanna Tao    MRN: [id number]           Patient Information     Date Of Birth          1980        Visit Information        Provider Department      6/4/2018 4:20 PM Ray Mcleod DO [facility]        Today's Diagnoses     Chronic seasonal allergic rhinitis due to pollen    -  1    Allergic rhinitis due to dust mite          Care Instructions    Allergy Staff Appt Hours Shot Hours Locations    Physician     Ray Mcleod DO       Support Staff     Aparna WESTFALL RN      Fariba WESTFALL, [facility]  Monday:                      [place] 8-7     Tuesday:         [place] 8-5     Wednesday:        [place]: 7-5     Friday:        [place] 7-5   [place]        Monday: 9-5:50        Wednesday: 2-5:50        Friday: 7-12:50     [place]        Tuesday: 7-10:50        Thursday: 1:30-6:30        Friday: 8:00-3:50     [place]        Monday: 7:10-4:50        Tuesday: 12:30-6:30        Thursday: 7-11:50 [facility]  [address] [place] [address]  Appt Line: [phone]  Allergy RN (Monday):  [phone]    [facility]  [address] [place] [address]  Appt Line: [phone]  Allergy RN (Tues & Wed):  [phone]    [facility]  [address] [place] [address]  Appt Line: [phone]  Allergy RN (Friday):  [phone]       Important Scheduling Information  Aspirin Desensitization: Appt will last 2 clinic days. Please call the Allergy RN line for your clinic to schedule. Discontinue antihistamines 7 days prior to the appointment.     Food Challenges: Appt will last 3-4 hours. Please call the Allergy RN line for your clinic to schedule. Discontinue antihistamines 7 days prior to the appointment.     Penicillin Testing: Appt will last 2-3 hours. Please call the Allergy RN line for your clinic to schedule. Discontinue antihistamines 7 days prior to the appointment.     Skin Testing: Appt will about 40 minutes. Call the  appointment line for your clinic to schedule. Discontinue antihistamines 7 days prior to the appointment.     Venom Testing: Appt will last 2-3 hours. Please call the Allergy RN line for your clinic to schedule. Discontinue antihistamines 7 days prior to the appointment.     Thank you for trusting us with your Allergy, Asthma, and Immunology care. Please feel free to contact us with any questions or concerns you may have.                Follow-ups after your visit        Your next 10 appointments already scheduled     Jun 11, 2018  4:20 PM CDT   [facility] DO   [facility]    [address] [address] [phone]            Jun 18, 2018  4:40 PM CDT   [facility] Visit with [facility] Mcleod DO   [facility]    [address] [address] [phone]              Who to contact     If you have questions or need follow up information about today's clinic visit or your schedule please contact [facility] directly at [phone].  Normal or non-critical lab and imaging results will be communicated to you by MyChart, letter or phone within 4 business days after the clinic has received the results. If you do not hear from us within 7 days, please contact the clinic through MyChart or phone. If you have a critical or abnormal lab result, we will notify you by phone as soon as possible.  Submit refill requests through [employer] or call your pharmacy and they will forward the refill request to us. Please allow 3 business days for your refill to be completed.          Additional Information About Your Visit        Care EveryWhere ID     This is your Care EveryWhere ID. This could be used by other organizations to access your [facility] medical records  [id number]        Your Vitals Were     Pulse Temperature Respirations Pulse Oximetry BMI (Body Mass Index)       92 98.2  F  (36.8  C) (Oral) 16 98% 25.28 kg/m2        Blood Pressure from Last 3 Encounters:   06/04/18 127/85   05/21/18 114/79   05/14/18 115/72    Weight from Last 3 Encounters:   06/04/18 71 kg (156 lb 9.6 oz)   05/21/18 70.8 kg (156 lb)   05/14/18 72.1 kg (159 lb)              We Performed the Following     RAPID DESENSITIZATION          Where to get your medicines      These medications were sent to [id number] - [place], [place] - [address]  [address], [place] [address]    Hours:  test fax sent successfully 7/31/03  Phone:  [phone]     cetirizine 10 MG tablet    montelukast 10 MG tablet          Primary Care Provider Office Phone # Fax #    [facility] [phone] [address] [address]        Equal Access to Services     KWAME GHOSH AH: Hadii aad ku hadasho Soomaali, waaxda luqadaha, qaybta kaalmada adeegyada, waxay idiin hayclarisse winters . So [facility] [phone].    ATENCIÓN: Si habla español, tiene a mc disposición servicios gratuitos de asistencia lingüística. [facility] [phone].    We comply with applicable federal civil rights laws and [place] laws. We do not discriminate on the basis of race, color, national origin, age, disability, sex, sexual orientation, or gender identity.            Thank you!     Thank you for choosing [facility]  for your care. Our goal is always to provide you with excellent care. Hearing back from our patients is one way we can continue to improve our services. Please take a few minutes to complete the written survey that you may receive in the mail after your visit with us. Thank you!             Your Updated Medication List - Protect others around you: Learn how to safely use, store and throw away your medicines at www.disposemymeds.org.          This list is accurate as of 6/4/18  6:20 PM.  Always use your most recent med list.                   Brand Name Dispense  Instructions for use Diagnosis    ALLERGEN IMMUNOTHERAPY PRESCRIPTION     5 mL    Dust Mites DF 30,000AU/mL, HS  0.3 ml Dust Mites DP. 30,000 AU/mL, HS  0.3 ml  Jose, White 1:20 w/v, HS  1.0 ml Lamb's Quarters 1:20 w/v, HS 1.0 ml Diluent: HSA qs to 5ml    Allergic rhinitis due to dust mite       azelastine 0.1 % spray    ASTELIN    1 Bottle    Spray 2 sprays into both nostrils 2 times daily    Chronic seasonal allergic rhinitis due to pollen       cetirizine 10 MG tablet    zyrTEC    30 tablet    Take 1 tablet (10 mg) by mouth daily    Chronic seasonal allergic rhinitis due to pollen       EPINEPHrine 0.3 MG/0.3ML injection 2-pack    EPIPEN/ADRENACLICK/or ANY BX GENERIC EQUIV    0.6 mL    Inject 0.3 mLs (0.3 mg) into the muscle as needed for anaphylaxis    Need for desensitization to allergens       * fluticasone 50 MCG/ACT spray    FLONASE     Spray 2 sprays into both nostrils daily        * fluticasone 50 MCG/ACT spray    FLONASE    1 Bottle    Spray 2 sprays into both nostrils 2 times daily Office visit for further refills    Chronic seasonal allergic rhinitis due to pollen       * SINGULAIR PO           * montelukast 10 MG tablet    SINGULAIR    30 tablet    Take 1 tablet (10 mg) by mouth At Bedtime Office visit for further refills.    Chronic seasonal allergic rhinitis due to pollen       * Notice:  This list has 4 medication(s) that are the same as other medications prescribed for you. Read the directions carefully, and ask your doctor or other care provider to review them with you.

## 2018-06-04 NOTE — LETTER
6/4/2018         RE: Roxanna Tao  [address] [address]        Dear Colleague,    Thank you for referring your patient, Roxanna Tao, to the [facility]. Please see a copy of my visit note below.    Roxanna Tao is a 38 year old  male with previous medical history significant for allergic rhinitis who returns for a follow up visit. Patient presents today for cluster immunotherapy. The patient is currently in a good state of health. No recent fevers, chills, cough, wheezing, shortness of breath, skin rash, angioedema, nausea, vomiting or diarrhea. The risks and benefits were discussed and the patient/patient's family wishes to proceed. The consent was signed.     History reviewed. No pertinent past medical history.  History reviewed. No pertinent family history.  History reviewed. No pertinent surgical history.    REVIEW OF SYSTEMS:  General: negative for weight gain. negative for weight loss. negative for changes in sleep.   Ears: negative for fullness. negative for hearing loss. negative for dizziness.   Nose: negative for snoring.negative for changes in smell. negative for drainage.   Throat: negative for hoarseness. negative for sore throat. negative for trouble swallowing.   Lungs: negative for shortness of breath.negative for wheezing. negative for sputum production.   Cardiovascular: negative for chest pain. negative for swelling of ankles. negative for fast or irregular heartbeat.   Gastrointestinal: negative for nausea. negative for heartburn. negative for acid reflux.   Musculoskeletal: negative for joint pain. negative for joint stiffness. negative for joint swelling.   Neurologic: negative for seizures. negative for fainting. negative for weakness.   Psychiatric: negative for changes in mood. negative for anxiety.   Endocrine: negative for cold intolerance. negative for heat intolerance. negative for tremors.   Hematologic: negative for easy  bruising. negative for easy bleeding.  Integumentary: negative for rash. negative for scaling. negative for nail changes.       Current Outpatient Prescriptions:      azelastine (ASTELIN) 0.1 % spray, Spray 2 sprays into both nostrils 2 times daily, Disp: 1 Bottle, Rfl: 3     cetirizine (ZYRTEC) 10 MG tablet, Take 1 tablet (10 mg) by mouth daily, Disp: 30 tablet, Rfl: 11     EPINEPHrine (EPIPEN/ADRENACLICK/OR ANY BX GENERIC EQUIV) 0.3 MG/0.3ML injection 2-pack, Inject 0.3 mLs (0.3 mg) into the muscle as needed for anaphylaxis, Disp: 0.6 mL, Rfl: 0     fluticasone (FLONASE) 50 MCG/ACT spray, Spray 2 sprays into both nostrils 2 times daily Office visit for further refills, Disp: 1 Bottle, Rfl: 0     montelukast (SINGULAIR) 10 MG tablet, Take 1 tablet (10 mg) by mouth At Bedtime Office visit for further refills., Disp: 30 tablet, Rfl: 11     Montelukast Sodium (SINGULAIR PO), , Disp: , Rfl:      ORDER FOR ALLERGEN IMMUNOTHERAPY, Dust Mites DF 30,000AU/mL, HS  0.3 ml Dust Mites DP. 30,000 AU/mL, HS  0.3 ml  Jose, White 1:20 w/v, HS  1.0 ml Lamb's Quarters 1:20 w/v, HS 1.0 ml Diluent: HSA qs to 5ml, Disp: 5 mL, Rfl: prn     fluticasone (FLONASE) 50 MCG/ACT spray, Spray 2 sprays into both nostrils daily, Disp: , Rfl:      [DISCONTINUED] montelukast (SINGULAIR) 10 MG tablet, Take 1 tablet (10 mg) by mouth At Bedtime Office visit for further refills., Disp: 30 tablet, Rfl: 0    There is no immunization history on file for this patient.  No Known Allergies      EXAM:   Constitutional:  Appears well-developed and well-nourished. No distress.   HEENT:   Head: Normocephalic.   Mouth/Throat:   Cobblestoning of posterior oropharynx.   Boggy nasal tissue.    Cardiovascular: Normal rate, regular rhythm and normal heart sounds. Exam reveals no gallop and no friction rub.   No murmur heard.  Respiratory: Effort normal and breath sounds normal. No respiratory distress. No wheezes. No rales.   Musculoskeletal: Normal range of motion.     Neuro: Oriented to person, place, and time.  Skin: Skin is warm and dry. No rash noted.   Psychiatric: Normal mood and affect.     Nursing note and vitals reviewed.      WORKUP:   Cluster immunotherapy   The patient received red 0.05, red 0.10. Each dose was  by 30 minutes. Full report will be scanned into electronic medical record. The patient was in office for over 1.0 hours.    ASSESSMENT/PLAN:  Problem List Items Addressed This Visit        Respiratory    Allergic rhinitis due to dust mite     Perennial nasal and ocular symptoms for the last 11 years. Flonase has been used and non-beneficial. Zyrtec, montelukast and other oral antihistamines have been non-beneficial. Using refresh eyedrops.  Using that a netti pot and this has been beneficial.       Tolerated cluster immunotherapy today.       - Flonase 50mcg 2 sprays/nostril q day.   - Azelastine 2 sprays/nostril twice daily as needed.   - Cetirizine (Zyrtec) 10mg by mouth daily.   - On allergen immunotherapy and tolerating. Continue cluster immunotherapy.          Relevant Medications    cetirizine (ZYRTEC) 10 MG tablet    montelukast (SINGULAIR) 10 MG tablet    Other Relevant Orders    RAPID DESENSITIZATION    Chronic seasonal allergic rhinitis due to pollen - Primary    Relevant Medications    cetirizine (ZYRTEC) 10 MG tablet    montelukast (SINGULAIR) 10 MG tablet    Other Relevant Orders    RAPID DESENSITIZATION          Chart documentation with Dragon Voice recognition Software. Although reviewed after completion, some words and grammatical errors may remain.    Ray Mcleod,    Allergy/Immunology  [facility] [place] and [place], [place]      Again, thank you for allowing me to participate in the care of your patient.        Sincerely,        Ray Mcleod, DO

## 2018-06-04 NOTE — PATIENT INSTRUCTIONS
Allergy Staff Appt Hours Shot Hours Locations    Physician     Ray Mcleod DO       Support Staff     Aparna WESTFALL, RN      Fariba WESTFALL, [facility]  Monday:                      [place] 8-7     Tuesday:         [place] 8-5     Wednesday:        [place]: 7-5     Friday:        [place] 7-5   [place]        Monday: 9-5:50        Wednesday: 2-5:50        Friday: 7-12:50     [place]        Tuesday: 7-10:50        Thursday: 1:30-6:30        Friday: 8:00-3:50     [place] Monday: 7:10-4:50        Tuesday: 12:30-6:30        Thursday: 7-11:50 [facility]  [address] [place] [address]  Appt Line: [phone]  Allergy RN (Monday):  [phone]    [facility]  [address] [place] [address]  Appt Line: [phone]  Allergy RN (Tues & Wed):  [phone]    [facility]  [address] [place] [address]  Appt Line: [phone]  Allergy RN (Friday):  [phone]       Important Scheduling Information  Aspirin Desensitization: Appt will last 2 clinic days. Please call the Allergy RN line for your clinic to schedule. Discontinue antihistamines 7 days prior to the appointment.     Food Challenges: Appt will last 3-4 hours. Please call the Allergy RN line for your clinic to schedule. Discontinue antihistamines 7 days prior to the appointment.     Penicillin Testing: Appt will last 2-3 hours. Please call the Allergy RN line for your clinic to schedule. Discontinue antihistamines 7 days prior to the appointment.     Skin Testing: Appt will about 40 minutes. Call the appointment line for your clinic to schedule. Discontinue antihistamines 7 days prior to the appointment.     Venom Testing: Appt will last 2-3 hours. Please call the Allergy RN line for your clinic to schedule. Discontinue antihistamines 7 days prior to the appointment.     Thank you for trusting us with your Allergy, Asthma, and Immunology care. Please feel free to contact us with any questions or concerns you may  have.

## 2018-06-04 NOTE — PROGRESS NOTES
Roxanna Tao is a 38 year old  male with previous medical history significant for allergic rhinitis who returns for a follow up visit. Patient presents today for cluster immunotherapy. The patient is currently in a good state of health. No recent fevers, chills, cough, wheezing, shortness of breath, skin rash, angioedema, nausea, vomiting or diarrhea. The risks and benefits were discussed and the patient/patient's family wishes to proceed. The consent was signed.     History reviewed. No pertinent past medical history.  History reviewed. No pertinent family history.  History reviewed. No pertinent surgical history.    REVIEW OF SYSTEMS:  General: negative for weight gain. negative for weight loss. negative for changes in sleep.   Ears: negative for fullness. negative for hearing loss. negative for dizziness.   Nose: negative for snoring.negative for changes in smell. negative for drainage.   Throat: negative for hoarseness. negative for sore throat. negative for trouble swallowing.   Lungs: negative for shortness of breath.negative for wheezing. negative for sputum production.   Cardiovascular: negative for chest pain. negative for swelling of ankles. negative for fast or irregular heartbeat.   Gastrointestinal: negative for nausea. negative for heartburn. negative for acid reflux.   Musculoskeletal: negative for joint pain. negative for joint stiffness. negative for joint swelling.   Neurologic: negative for seizures. negative for fainting. negative for weakness.   Psychiatric: negative for changes in mood. negative for anxiety.   Endocrine: negative for cold intolerance. negative for heat intolerance. negative for tremors.   Hematologic: negative for easy bruising. negative for easy bleeding.  Integumentary: negative for rash. negative for scaling. negative for nail changes.       Current Outpatient Prescriptions:      azelastine (ASTELIN) 0.1 % spray, Spray 2 sprays into both nostrils 2 times daily,  Disp: 1 Bottle, Rfl: 3     cetirizine (ZYRTEC) 10 MG tablet, Take 1 tablet (10 mg) by mouth daily, Disp: 30 tablet, Rfl: 11     EPINEPHrine (EPIPEN/ADRENACLICK/OR ANY BX GENERIC EQUIV) 0.3 MG/0.3ML injection 2-pack, Inject 0.3 mLs (0.3 mg) into the muscle as needed for anaphylaxis, Disp: 0.6 mL, Rfl: 0     fluticasone (FLONASE) 50 MCG/ACT spray, Spray 2 sprays into both nostrils 2 times daily Office visit for further refills, Disp: 1 Bottle, Rfl: 0     montelukast (SINGULAIR) 10 MG tablet, Take 1 tablet (10 mg) by mouth At Bedtime Office visit for further refills., Disp: 30 tablet, Rfl: 11     Montelukast Sodium (SINGULAIR PO), , Disp: , Rfl:      ORDER FOR ALLERGEN IMMUNOTHERAPY, Dust Mites DF 30,000AU/mL, HS  0.3 ml Dust Mites DP. 30,000 AU/mL, HS  0.3 ml  Jose, White 1:20 w/v, HS  1.0 ml Lamb's Quarters 1:20 w/v, HS 1.0 ml Diluent: HSA qs to 5ml, Disp: 5 mL, Rfl: prn     fluticasone (FLONASE) 50 MCG/ACT spray, Spray 2 sprays into both nostrils daily, Disp: , Rfl:      [DISCONTINUED] montelukast (SINGULAIR) 10 MG tablet, Take 1 tablet (10 mg) by mouth At Bedtime Office visit for further refills., Disp: 30 tablet, Rfl: 0    There is no immunization history on file for this patient.  No Known Allergies      EXAM:   Constitutional:  Appears well-developed and well-nourished. No distress.   HEENT:   Head: Normocephalic.   Mouth/Throat:   Cobblestoning of posterior oropharynx.   Boggy nasal tissue.    Cardiovascular: Normal rate, regular rhythm and normal heart sounds. Exam reveals no gallop and no friction rub.   No murmur heard.  Respiratory: Effort normal and breath sounds normal. No respiratory distress. No wheezes. No rales.   Musculoskeletal: Normal range of motion.    Neuro: Oriented to person, place, and time.  Skin: Skin is warm and dry. No rash noted.   Psychiatric: Normal mood and affect.     Nursing note and vitals reviewed.      WORKUP:   Cluster immunotherapy   The patient received red 0.05, red 0.10. Each  dose was  by 30 minutes. Full report will be scanned into electronic medical record. The patient was in office for over 1.0 hours.    ASSESSMENT/PLAN:  Problem List Items Addressed This Visit        Respiratory    Allergic rhinitis due to dust mite     Perennial nasal and ocular symptoms for the last 11 years. Flonase has been used and non-beneficial. Zyrtec, montelukast and other oral antihistamines have been non-beneficial. Using refresh eyedrops.  Using that a netti pot and this has been beneficial.       Tolerated cluster immunotherapy today.       - Flonase 50mcg 2 sprays/nostril q day.   - Azelastine 2 sprays/nostril twice daily as needed.   - Cetirizine (Zyrtec) 10mg by mouth daily.   - On allergen immunotherapy and tolerating. Continue cluster immunotherapy.          Relevant Medications    cetirizine (ZYRTEC) 10 MG tablet    montelukast (SINGULAIR) 10 MG tablet    Other Relevant Orders    RAPID DESENSITIZATION    Chronic seasonal allergic rhinitis due to pollen - Primary    Relevant Medications    cetirizine (ZYRTEC) 10 MG tablet    montelukast (SINGULAIR) 10 MG tablet    Other Relevant Orders    RAPID DESENSITIZATION          Chart documentation with Dragon Voice recognition Software. Although reviewed after completion, some words and grammatical errors may remain.    Ray Mcleod,    Allergy/Immunology  [facility], [place] and [place], [place]

## 2018-06-11 ENCOUNTER — OFFICE VISIT (OUTPATIENT)
Dept: ALLERGY | Facility: CLINIC | Age: 38
End: 2018-06-11
Payer: COMMERCIAL

## 2018-06-11 VITALS
DIASTOLIC BLOOD PRESSURE: 79 MMHG | OXYGEN SATURATION: 99 % | HEIGHT: 67 IN | SYSTOLIC BLOOD PRESSURE: 118 MMHG | HEART RATE: 82 BPM | TEMPERATURE: 98.7 F

## 2018-06-11 DIAGNOSIS — J30.89 ALLERGIC RHINITIS DUE TO DUST MITE: ICD-10-CM

## 2018-06-11 DIAGNOSIS — J30.1 CHRONIC SEASONAL ALLERGIC RHINITIS DUE TO POLLEN: Primary | ICD-10-CM

## 2018-06-11 PROCEDURE — 95180 RAPID DESENSITIZATION: CPT | Performed by: ALLERGY & IMMUNOLOGY

## 2018-06-11 PROCEDURE — 99207 ZZC DROP WITH A PROCEDURE: CPT | Mod: 25 | Performed by: ALLERGY & IMMUNOLOGY

## 2018-06-11 NOTE — PATIENT INSTRUCTIONS
Allergy Staff Appt Hours Shot Hours Locations    Physician     Ray Mcleod DO       Support Staff     Aparna WESTFALL, RN      Fariba WESTFALL, [facility]  Monday:                      [place] 8-7     Tuesday:         [place] 8-5     Wednesday:        [place]: 7-5     Friday:        [place] 7-5   [place]        Monday: 9-5:50        Wednesday: 2-5:50        Friday: 7-12:50     [place]        Tuesday: 7-10:50        Thursday: 1:30-6:30        Friday: 8:00-3:50     [place] Monday: 7:10-4:50        Tuesday: 12:30-6:30        Thursday: 7-11:50 [facility]  [address] [place] [address]  Appt Line: [phone]  Allergy RN (Monday):  [phone]    [facility]  [address] [place] [address]  Appt Line: [phone]  Allergy RN (Tues & Wed):  [phone]    [facility]  [address] [place] [address]  Appt Line: [phone]  Allergy RN (Friday):  [phone]       Important Scheduling Information  Aspirin Desensitization: Appt will last 2 clinic days. Please call the Allergy RN line for your clinic to schedule. Discontinue antihistamines 7 days prior to the appointment.     Food Challenges: Appt will last 3-4 hours. Please call the Allergy RN line for your clinic to schedule. Discontinue antihistamines 7 days prior to the appointment.     Penicillin Testing: Appt will last 2-3 hours. Please call the Allergy RN line for your clinic to schedule. Discontinue antihistamines 7 days prior to the appointment.     Skin Testing: Appt will about 40 minutes. Call the appointment line for your clinic to schedule. Discontinue antihistamines 7 days prior to the appointment.     Venom Testing: Appt will last 2-3 hours. Please call the Allergy RN line for your clinic to schedule. Discontinue antihistamines 7 days prior to the appointment.     Thank you for trusting us with your Allergy, Asthma, and Immunology care. Please feel free to contact us with any questions or concerns you may  have.

## 2018-06-11 NOTE — MR AVS SNAPSHOT
After Visit Summary   6/11/2018    Roxanna Tao    MRN: [id number]           Patient Information     Date Of Birth          1980        Visit Information        Provider Department      6/11/2018 4:20 PM Ray Mcleod DO [facility]        Today's Diagnoses     Chronic seasonal allergic rhinitis due to pollen    -  1    Allergic rhinitis due to dust mite          Care Instructions    Allergy Staff Appt Hours Shot Hours Locations    Physician     Ray Mcleod DO       Support Staff     Aparna WESTFALL RN      Fariba WESTFALL, [facility]  Monday:                      [place] 8-7     Tuesday:         [place] 8-5     Wednesday:        [place]: 7-5     Friday:        [place] 7-5   [place]        Monday: 9-5:50        Wednesday: 2-5:50        Friday: 7-12:50     [place]        Tuesday: 7-10:50        Thursday: 1:30-6:30        Friday: 8:00-3:50     [place]        Monday: 7:10-4:50        Tuesday: 12:30-6:30        Thursday: 7-11:50 [facility]  [address] [place] [address]  Appt Line: [phone]  Allergy RN (Monday):  [phone]    [facility]  [address] [place] [address]  Appt Line: [phone]  Allergy RN (Tues & Wed):  [phone]    [facility]  [address] [place] [address]  Appt Line: [phone]  Allergy RN (Friday):  [phone]       Important Scheduling Information  Aspirin Desensitization: Appt will last 2 clinic days. Please call the Allergy RN line for your clinic to schedule. Discontinue antihistamines 7 days prior to the appointment.     Food Challenges: Appt will last 3-4 hours. Please call the Allergy RN line for your clinic to schedule. Discontinue antihistamines 7 days prior to the appointment.     Penicillin Testing: Appt will last 2-3 hours. Please call the Allergy RN line for your clinic to schedule. Discontinue antihistamines 7 days prior to the appointment.     Skin Testing: Appt will about 40 minutes. Call  "the appointment line for your clinic to schedule. Discontinue antihistamines 7 days prior to the appointment.     Venom Testing: Appt will last 2-3 hours. Please call the Allergy RN line for your clinic to schedule. Discontinue antihistamines 7 days prior to the appointment.     Thank you for trusting us with your Allergy, Asthma, and Immunology care. Please feel free to contact us with any questions or concerns you may have.                Follow-ups after your visit        Your next 10 appointments already scheduled     Jun 18, 2018  4:40 PM CDT   Return Visit with Ray Mcleod, DO   [facility]    [address] [facility] [address] [phone]              Who to contact     If you have questions or need follow up information about today's clinic visit or your schedule please contact [facility] directly at [phone].  Normal or non-critical lab and imaging results will be communicated to you by MyChart, letter or phone within 4 business days after the clinic has received the results. If you do not hear from us within 7 days, please contact the clinic through MyChart or phone. If you have a critical or abnormal lab result, we will notify you by phone as soon as possible.  Submit refill requests through [employer] or call your pharmacy and they will forward the refill request to us. Please allow 3 business days for your refill to be completed.          Additional Information About Your Visit        Care EveryWhere ID     This is your Care EveryWhere ID. This could be used by other organizations to access your [facility] medical records  [id number]        Your Vitals Were     Pulse Temperature Height Pulse Oximetry          82 98.7  F (37.1  C) (Oral) 1.702 m (5' 7\") 99%         Blood Pressure from Last 3 Encounters:   06/11/18 118/79   06/04/18 127/85   05/21/18 114/79    Weight from Last 3 Encounters:   06/04/18 71 kg (156 lb 9.6 oz)   05/21/18 " 70.8 kg (156 lb)   05/14/18 72.1 kg (159 lb)              We Performed the Following     RAPID DESENSITIZATION        Primary Care Provider Office Phone # Fax #    [facility] [phone] [address] [facility] [address]        Equal Access to Services     RICHARD GHOSH : Hadii aad ku hadasho Soomaali, waaxda luqadaha, qaybta kaalmada adeegyada, waxay idiin hayaan adeeg khtiff lapaveln . So [facility] [phone].    ATENCIÓN: Si habla español, tiene a mc disposición servicios gratuitos de asistencia lingüística. Laura al [phone].    We comply with applicable federal civil rights laws and [place] laws. We do not discriminate on the basis of race, color, national origin, age, disability, sex, sexual orientation, or gender identity.            Thank you!     Thank you for choosing [facility]  for your care. Our goal is always to provide you with excellent care. Hearing back from our patients is one way we can continue to improve our services. Please take a few minutes to complete the written survey that you may receive in the mail after your visit with us. Thank you!             Your Updated Medication List - Protect others around you: Learn how to safely use, store and throw away your medicines at www.disposemymeds.org.          This list is accurate as of 6/11/18  5:46 PM.  Always use your most recent med list.                   Brand Name Dispense Instructions for use Diagnosis    ALLERGEN IMMUNOTHERAPY PRESCRIPTION     5 mL    Dust Mites DF 30,000AU/mL, HS  0.3 ml Dust Mites DP. 30,000 AU/mL, HS  0.3 ml  Jose, White 1:20 w/v, HS  1.0 ml Lamb's Quarters 1:20 w/v, HS 1.0 ml Diluent: HSA qs to 5ml    Allergic rhinitis due to dust mite       azelastine 0.1 % spray    ASTELIN    1 Bottle    Spray 2 sprays into both nostrils 2 times daily    Chronic seasonal allergic rhinitis due to pollen       cetirizine 10 MG tablet    zyrTEC    30 tablet    Take 1 tablet (10 mg) by  mouth daily    Chronic seasonal allergic rhinitis due to pollen       EPINEPHrine 0.3 MG/0.3ML injection 2-pack    EPIPEN/ADRENACLICK/or ANY BX GENERIC EQUIV    0.6 mL    Inject 0.3 mLs (0.3 mg) into the muscle as needed for anaphylaxis    Need for desensitization to allergens       * fluticasone 50 MCG/ACT spray    FLONASE     Spray 2 sprays into both nostrils daily        * fluticasone 50 MCG/ACT spray    FLONASE    1 Bottle    Spray 2 sprays into both nostrils 2 times daily Office visit for further refills    Chronic seasonal allergic rhinitis due to pollen       * SINGULAIR PO           * montelukast 10 MG tablet    SINGULAIR    30 tablet    Take 1 tablet (10 mg) by mouth At Bedtime Office visit for further refills.    Chronic seasonal allergic rhinitis due to pollen       * Notice:  This list has 4 medication(s) that are the same as other medications prescribed for you. Read the directions carefully, and ask your doctor or other care provider to review them with you.

## 2018-06-11 NOTE — PROGRESS NOTES
Roxanna Tao is a 38 year old  male with previous medical history significant for allergic rhinitis who returns for a follow up visit. Patient presents today for cluster immunotherapy. The patient is currently in a good state of health. No recent fevers, chills, cough, wheezing, shortness of breath, skin rash, angioedema, nausea, vomiting or diarrhea. The risks and benefits were discussed and the patient/patient's family wishes to proceed. The consent was signed.    No past medical history on file.  No family history on file.  No past surgical history on file.    REVIEW OF SYSTEMS:  General: negative for weight gain. negative for weight loss. negative for changes in sleep.   Ears: negative for fullness. negative for hearing loss. negative for dizziness.   Nose: negative for snoring.negative for changes in smell. negative for drainage.   Throat: negative for hoarseness. negative for sore throat. negative for trouble swallowing.   Lungs: negative for shortness of breath.negative for wheezing. negative for sputum production.   Cardiovascular: negative for chest pain. negative for swelling of ankles. negative for fast or irregular heartbeat.   Gastrointestinal: negative for nausea. negative for heartburn. negative for acid reflux.   Musculoskeletal: negative for joint pain. negative for joint stiffness. negative for joint swelling.   Neurologic: negative for seizures. negative for fainting. negative for weakness.   Psychiatric: negative for changes in mood. negative for anxiety.   Endocrine: negative for cold intolerance. negative for heat intolerance. negative for tremors.   Hematologic: negative for easy bruising. negative for easy bleeding.  Integumentary: negative for rash. negative for scaling. negative for nail changes.       Current Outpatient Prescriptions:      azelastine (ASTELIN) 0.1 % spray, Spray 2 sprays into both nostrils 2 times daily, Disp: 1 Bottle, Rfl: 3     cetirizine (ZYRTEC) 10 MG  tablet, Take 1 tablet (10 mg) by mouth daily, Disp: 30 tablet, Rfl: 11     EPINEPHrine (EPIPEN/ADRENACLICK/OR ANY BX GENERIC EQUIV) 0.3 MG/0.3ML injection 2-pack, Inject 0.3 mLs (0.3 mg) into the muscle as needed for anaphylaxis, Disp: 0.6 mL, Rfl: 0     fluticasone (FLONASE) 50 MCG/ACT spray, Spray 2 sprays into both nostrils 2 times daily Office visit for further refills, Disp: 1 Bottle, Rfl: 0     fluticasone (FLONASE) 50 MCG/ACT spray, Spray 2 sprays into both nostrils daily, Disp: , Rfl:      montelukast (SINGULAIR) 10 MG tablet, Take 1 tablet (10 mg) by mouth At Bedtime Office visit for further refills., Disp: 30 tablet, Rfl: 11     Montelukast Sodium (SINGULAIR PO), , Disp: , Rfl:      ORDER FOR ALLERGEN IMMUNOTHERAPY, Dust Mites DF 30,000AU/mL, HS  0.3 ml Dust Mites DP. 30,000 AU/mL, HS  0.3 ml  Jose, White 1:20 w/v, HS  1.0 ml Lamb's Quarters 1:20 w/v, HS 1.0 ml Diluent: HSA qs to 5ml, Disp: 5 mL, Rfl: prn    There is no immunization history on file for this patient.  No Known Allergies      EXAM:   Constitutional:  Appears well-developed and well-nourished. No distress.   HEENT:   Head: Normocephalic.   Mouth/Throat:   Cobblestoning of posterior oropharynx.   Boggy nasal tissue.    Cardiovascular: Normal rate, regular rhythm and normal heart sounds. Exam reveals no gallop and no friction rub.   No murmur heard.  Respiratory: Effort normal and breath sounds normal. No respiratory distress. No wheezes. No rales.   Musculoskeletal: Normal range of motion.    Neuro: Oriented to person, place, and time.  Skin: Skin is warm and dry. No rash noted.   Psychiatric: Normal mood and affect.      Nursing note and vitals reviewed.    WORKUP:   Cluster immunotherapy   The patient received red 0.15, red 0.20. Each dose was  by 30 minutes. Full report will be scanned into electronic medical record. The patient was in office for over 1.0 hours.      ASSESSMENT/PLAN:  Problem List Items Addressed This Visit         Respiratory    Allergic rhinitis due to dust mite     Perennial nasal and ocular symptoms for the last 11 years. Flonase has been used and non-beneficial. Zyrtec, montelukast and other oral antihistamines have been non-beneficial. Using refresh eyedrops.  Using that a netti pot and this has been beneficial.       Tolerated cluster immunotherapy today.       - Flonase 50mcg 2 sprays/nostril q day.   - Azelastine 2 sprays/nostril twice daily as needed.   - Cetirizine (Zyrtec) 10mg by mouth daily.   - On allergen immunotherapy and tolerating. Continue cluster immunotherapy.          Relevant Orders    RAPID DESENSITIZATION (Completed)    Chronic seasonal allergic rhinitis due to pollen - Primary    Relevant Orders    RAPID DESENSITIZATION (Completed)          Chart documentation with Dragon Voice recognition Software. Although reviewed after completion, some words and grammatical errors may remain.    Ray Mcleod,    Allergy/Immunology  [facility] [place] and [place]

## 2018-06-11 NOTE — LETTER
6/11/2018         RE: Roxanna Tao  [address] [facility] [address]        Dear Colleague,    Thank you for referring your patient, Roxanna Tao, to the [facility]. Please see a copy of my visit note below.    Roxanna Tao is a 38 year old  male with previous medical history significant for allergic rhinitis who returns for a follow up visit. Patient presents today for cluster immunotherapy. The patient is currently in a good state of health. No recent fevers, chills, cough, wheezing, shortness of breath, skin rash, angioedema, nausea, vomiting or diarrhea. The risks and benefits were discussed and the patient/patient's family wishes to proceed. The consent was signed.    No past medical history on file.  No family history on file.  No past surgical history on file.    REVIEW OF SYSTEMS:  General: negative for weight gain. negative for weight loss. negative for changes in sleep.   Ears: negative for fullness. negative for hearing loss. negative for dizziness.   Nose: negative for snoring.negative for changes in smell. negative for drainage.   Throat: negative for hoarseness. negative for sore throat. negative for trouble swallowing.   Lungs: negative for shortness of breath.negative for wheezing. negative for sputum production.   Cardiovascular: negative for chest pain. negative for swelling of ankles. negative for fast or irregular heartbeat.   Gastrointestinal: negative for nausea. negative for heartburn. negative for acid reflux.   Musculoskeletal: negative for joint pain. negative for joint stiffness. negative for joint swelling.   Neurologic: negative for seizures. negative for fainting. negative for weakness.   Psychiatric: negative for changes in mood. negative for anxiety.   Endocrine: negative for cold intolerance. negative for heat intolerance. negative for tremors.   Hematologic: negative for easy bruising. negative for easy bleeding.  Integumentary:  negative for rash. negative for scaling. negative for nail changes.       Current Outpatient Prescriptions:      azelastine (ASTELIN) 0.1 % spray, Spray 2 sprays into both nostrils 2 times daily, Disp: 1 Bottle, Rfl: 3     cetirizine (ZYRTEC) 10 MG tablet, Take 1 tablet (10 mg) by mouth daily, Disp: 30 tablet, Rfl: 11     EPINEPHrine (EPIPEN/ADRENACLICK/OR ANY BX GENERIC EQUIV) 0.3 MG/0.3ML injection 2-pack, Inject 0.3 mLs (0.3 mg) into the muscle as needed for anaphylaxis, Disp: 0.6 mL, Rfl: 0     fluticasone (FLONASE) 50 MCG/ACT spray, Spray 2 sprays into both nostrils 2 times daily Office visit for further refills, Disp: 1 Bottle, Rfl: 0     fluticasone (FLONASE) 50 MCG/ACT spray, Spray 2 sprays into both nostrils daily, Disp: , Rfl:      montelukast (SINGULAIR) 10 MG tablet, Take 1 tablet (10 mg) by mouth At Bedtime Office visit for further refills., Disp: 30 tablet, Rfl: 11     Montelukast Sodium (SINGULAIR PO), , Disp: , Rfl:      ORDER FOR ALLERGEN IMMUNOTHERAPY, Dust Mites DF 30,000AU/mL, HS  0.3 ml Dust Mites DP. 30,000 AU/mL, HS  0.3 ml  Jose, White 1:20 w/v, HS  1.0 ml Lamb's Quarters 1:20 w/v, HS 1.0 ml Diluent: HSA qs to 5ml, Disp: 5 mL, Rfl: prn    There is no immunization history on file for this patient.  No Known Allergies      EXAM:   Constitutional:  Appears well-developed and well-nourished. No distress.   HEENT:   Head: Normocephalic.   Mouth/Throat:   Cobblestoning of posterior oropharynx.   Boggy nasal tissue.    Cardiovascular: Normal rate, regular rhythm and normal heart sounds. Exam reveals no gallop and no friction rub.   No murmur heard.  Respiratory: Effort normal and breath sounds normal. No respiratory distress. No wheezes. No rales.   Musculoskeletal: Normal range of motion.    Neuro: Oriented to person, place, and time.  Skin: Skin is warm and dry. No rash noted.   Psychiatric: Normal mood and affect.      Nursing note and vitals reviewed.    WORKUP:   Cluster immunotherapy   The  patient received red 0.15, red 0.20. Each dose was  by 30 minutes. Full report will be scanned into electronic medical record. The patient was in office for over 1.0 hours.      ASSESSMENT/PLAN:  Problem List Items Addressed This Visit        Respiratory    Allergic rhinitis due to dust mite     Perennial nasal and ocular symptoms for the last 11 years. Flonase has been used and non-beneficial. Zyrtec, montelukast and other oral antihistamines have been non-beneficial. Using refresh eyedrops.  Using that a netti pot and this has been beneficial.       Tolerated cluster immunotherapy today.       - Flonase 50mcg 2 sprays/nostril q day.   - Azelastine 2 sprays/nostril twice daily as needed.   - Cetirizine (Zyrtec) 10mg by mouth daily.   - On allergen immunotherapy and tolerating. Continue cluster immunotherapy.          Relevant Orders    RAPID DESENSITIZATION (Completed)    Chronic seasonal allergic rhinitis due to pollen - Primary    Relevant Orders    RAPID DESENSITIZATION (Completed)          Chart documentation with Dragon Voice recognition Software. Although reviewed after completion, some words and grammatical errors may remain.    Ray Mcleod DO   Allergy/Immunology  [facility], [place] and [place], [place]      Patient presented after waiting 30 minutes with no reaction to cluster allergy injections. Discharged from clinic.    Spike Trevino RN....6/11/2018 5:42 PM       Again, thank you for allowing me to participate in the care of your patient.        Sincerely,        Ray Mcleod DO

## 2018-06-11 NOTE — PROGRESS NOTES
Patient presented after waiting 30 minutes with no reaction to cluster allergy injections. Discharged from clinic.    Spike Trevino RN....6/11/2018 5:42 PM

## 2018-06-18 ENCOUNTER — OFFICE VISIT (OUTPATIENT)
Dept: ALLERGY | Facility: CLINIC | Age: 38
End: 2018-06-18
Payer: COMMERCIAL

## 2018-06-18 VITALS
HEART RATE: 74 BPM | OXYGEN SATURATION: 100 % | DIASTOLIC BLOOD PRESSURE: 80 MMHG | SYSTOLIC BLOOD PRESSURE: 112 MMHG | WEIGHT: 160.2 LBS | TEMPERATURE: 98.6 F | BODY MASS INDEX: 25.09 KG/M2

## 2018-06-18 DIAGNOSIS — J30.89 ALLERGIC RHINITIS DUE TO DUST MITE: ICD-10-CM

## 2018-06-18 DIAGNOSIS — J30.1 CHRONIC SEASONAL ALLERGIC RHINITIS DUE TO POLLEN: Primary | ICD-10-CM

## 2018-06-18 PROCEDURE — 95180 RAPID DESENSITIZATION: CPT | Performed by: ALLERGY & IMMUNOLOGY

## 2018-06-18 PROCEDURE — 99207 ZZC DROP WITH A PROCEDURE: CPT | Mod: 25 | Performed by: ALLERGY & IMMUNOLOGY

## 2018-06-18 NOTE — PROGRESS NOTES
Roxanna Tao is a 38 year old  male with previous medical history significant for allergic rhinitis who returns for a follow up visit. Patient presents today for cluster immunotherapy. The patient is currently in a good state of health. No recent fevers, chills, cough, wheezing, shortness of breath, skin rash, angioedema, nausea, vomiting or diarrhea. The risks and benefits were discussed and the patient/patient's family wishes to proceed. The consent was signed.    History reviewed. No pertinent past medical history.  History reviewed. No pertinent family history.  History reviewed. No pertinent surgical history.    REVIEW OF SYSTEMS:  General: negative for weight gain. negative for weight loss. negative for changes in sleep.   Ears: negative for fullness. negative for hearing loss. negative for dizziness.   Nose: negative for snoring.negative for changes in smell. negative for drainage.   Throat: negative for hoarseness. negative for sore throat. negative for trouble swallowing.   Lungs: negative for shortness of breath.negative for wheezing. negative for sputum production.   Cardiovascular: negative for chest pain. negative for swelling of ankles. negative for fast or irregular heartbeat.   Gastrointestinal: negative for nausea. negative for heartburn. negative for acid reflux.   Musculoskeletal: negative for joint pain. negative for joint stiffness. negative for joint swelling.   Neurologic: negative for seizures. negative for fainting. negative for weakness.   Psychiatric: negative for changes in mood. negative for anxiety.   Endocrine: negative for cold intolerance. negative for heat intolerance. negative for tremors.   Hematologic: negative for easy bruising. negative for easy bleeding.  Integumentary: negative for rash. negative for scaling. negative for nail changes.       Current Outpatient Prescriptions:      azelastine (ASTELIN) 0.1 % spray, Spray 2 sprays into both nostrils 2 times daily,  Disp: 1 Bottle, Rfl: 3     cetirizine (ZYRTEC) 10 MG tablet, Take 1 tablet (10 mg) by mouth daily, Disp: 30 tablet, Rfl: 11     EPINEPHrine (EPIPEN/ADRENACLICK/OR ANY BX GENERIC EQUIV) 0.3 MG/0.3ML injection 2-pack, Inject 0.3 mLs (0.3 mg) into the muscle as needed for anaphylaxis, Disp: 0.6 mL, Rfl: 0     fluticasone (FLONASE) 50 MCG/ACT spray, Spray 2 sprays into both nostrils 2 times daily Office visit for further refills, Disp: 1 Bottle, Rfl: 0     fluticasone (FLONASE) 50 MCG/ACT spray, Spray 2 sprays into both nostrils daily, Disp: , Rfl:      montelukast (SINGULAIR) 10 MG tablet, Take 1 tablet (10 mg) by mouth At Bedtime Office visit for further refills., Disp: 30 tablet, Rfl: 11     Montelukast Sodium (SINGULAIR PO), , Disp: , Rfl:      ORDER FOR ALLERGEN IMMUNOTHERAPY, Dust Mites DF 30,000AU/mL, HS  0.3 ml Dust Mites DP. 30,000 AU/mL, HS  0.3 ml  Jose, White 1:20 w/v, HS  1.0 ml Lamb's Quarters 1:20 w/v, HS 1.0 ml Diluent: HSA qs to 5ml, Disp: 5 mL, Rfl: prn    There is no immunization history on file for this patient.  No Known Allergies      EXAM:   Constitutional:  Appears well-developed and well-nourished. No distress.   HEENT:   Head: Normocephalic.   Mouth/Throat: No oropharyngeal exudate present.   No cobblestoning of posterior oropharynx.   Nasal tissue pink and normal appearing.  No rhinorrhea noted.    Eyes: Conjunctivae are non-erythematous   Cardiovascular: Normal rate, regular rhythm and normal heart sounds. Exam reveals no gallop and no friction rub.   No murmur heard.  Respiratory: Effort normal and breath sounds normal. No respiratory distress. No wheezes. No rales.   Musculoskeletal: Normal range of motion.   Neuro: Oriented to person, place, and time.  Skin: Skin is warm and dry. No rash noted.   Psychiatric: Normal mood and affect.     Nursing note and vitals reviewed.      WORKUP:   Cluster immunotherapy   The patient received red 0.3, red 0.40. Each dose was  by 30 minutes. Full  report will be scanned into electronic medical record. The patient was in office for over 1.0 hours.    ASSESSMENT/PLAN:  Problem List Items Addressed This Visit        Respiratory    Allergic rhinitis due to dust mite     Perennial nasal and ocular symptoms for the last 11 years. Flonase has been used and non-beneficial. Zyrtec, montelukast and other oral antihistamines have been non-beneficial. Using refresh eyedrops.  Using that a netti pot and this has been beneficial.       Tolerated cluster immunotherapy today.       - Flonase 50mcg 2 sprays/nostril q day.   - Azelastine 2 sprays/nostril twice daily as needed.   - Cetirizine (Zyrtec) 10mg by mouth daily.   - On allergen immunotherapy and tolerating. Continue traditional immunotherapy. Finished cluster immunotherapy.          Relevant Orders    RAPID DESENSITIZATION    Chronic seasonal allergic rhinitis due to pollen - Primary    Relevant Orders    RAPID DESENSITIZATION          Chart documentation with Dragon Voice recognition Software. Although reviewed after completion, some words and grammatical errors may remain.    Ray Mcleod,    Allergy/Immunology  [facility], [place] and [place], [place]

## 2018-06-18 NOTE — MR AVS SNAPSHOT
After Visit Summary   6/18/2018    Roxanna Tao    MRN: [id number]           Patient Information     Date Of Birth          1980        Visit Information        Provider Department      6/18/2018 4:40 PM Ray Mcleod DO [facility]        Today's Diagnoses     Chronic seasonal allergic rhinitis due to pollen    -  1    Allergic rhinitis due to dust mite          Care Instructions    Allergy Staff Appt Hours Shot Hours Locations    Physician     Ray Mcleod DO       Support Staff     Aparna WESTFALL RN      Fariba WESTFALL, [facility]  Monday:                      [place] 8-7     Tuesday:         [place] 8-5     Wednesday:        [place]: 7-5     Friday:        [place] 7-5   [place]        Monday: 9-5:50        Wednesday: 2-5:50        Friday: 7-12:50     [place]        Tuesday: 7-10:50        Thursday: 1:30-6:30        Friday: 8:00-3:50     [place]        Monday: 7:10-4:50        Tuesday: 12:30-6:30        Thursday: 7-11:50 [facility]  [address] [place] [address]  Appt Line: [phone]  Allergy RN (Monday):  [phone]    [facility]  [address] [place] [address]  Appt Line: [phone]  Allergy RN (Tues & Wed):  [phone]    [facility]  [address] [place] [address]  Appt Line: [phone]  Allergy RN (Friday):  [phone]       Important Scheduling Information  Aspirin Desensitization: Appt will last 2 clinic days. Please call the Allergy RN line for your clinic to schedule. Discontinue antihistamines 7 days prior to the appointment.     Food Challenges: Appt will last 3-4 hours. Please call the Allergy RN line for your clinic to schedule. Discontinue antihistamines 7 days prior to the appointment.     Penicillin Testing: Appt will last 2-3 hours. Please call the Allergy RN line for your clinic to schedule. Discontinue antihistamines 7 days prior to the appointment.     Skin Testing: Appt will about 40 minutes. Call  the appointment line for your clinic to schedule. Discontinue antihistamines 7 days prior to the appointment.     Venom Testing: Appt will last 2-3 hours. Please call the Allergy RN line for your clinic to schedule. Discontinue antihistamines 7 days prior to the appointment.     Thank you for trusting us with your Allergy, Asthma, and Immunology care. Please feel free to contact us with any questions or concerns you may have.                Follow-ups after your visit        Your next 10 appointments already scheduled     Jun 25, 2018  4:20 PM CDT   Nurse Only with AN ALLERGY SHOTS   [facility] [place] [facility] [place]    [address]  [place] [address]   [phone]            Jul 09, 2018  4:20 PM CDT   Nurse Only with AN ALLERGY SHOTS   [facility] [place] ([facility] [place]    [address]  [place] [address]   [phone]            Jul 23, 2018  4:20 PM CDT   Nurse Only with AN ALLERGY SHOTS   [facility] [place] ([facility] [place]    [address]  [place] [address]   [phone]            Aug 06, 2018  4:20 PM CDT   Nurse Only with AN ALLERGY SHOTS   [facility] [place] ([facility] [place])    [address]  [place] [address]   [phone]              Who to contact     If you have questions or need follow up information about today's clinic visit or your schedule please contact [facility] directly at [phone].  Normal or non-critical lab and imaging results will be communicated to you by [employer], letter or phone within 4 business days after the clinic has received the results. If you do not hear from us within 7 days, please contact the clinic through [employer] or phone. If you have a critical or abnormal lab result, we will notify you by phone as soon as possible.  Submit refill requests through [employer] or call your pharmacy and they will forward the refill request to us. Please allow 3  business days for your refill to be completed.          Additional Information About Your Visit        Care EveryWhere ID     This is your Care EveryWhere ID. This could be used by other organizations to access your [facility] medical records  [id number]        Your Vitals Were     Pulse Temperature Pulse Oximetry BMI (Body Mass Index)          74 98.6  F (37  C) (Oral) 100% 25.09 kg/m2         Blood Pressure from Last 3 Encounters:   06/18/18 112/80   06/11/18 118/79   06/04/18 127/85    Weight from Last 3 Encounters:   06/18/18 72.7 kg (160 lb 3.2 oz)   06/04/18 71 kg (156 lb 9.6 oz)   05/21/18 70.8 kg (156 lb)              We Performed the Following     RAPID DESENSITIZATION        Primary Care Provider Office Phone # Fax #    [facility] [phone] [address] [address]        Equal Access to Services     RICHARD GHOSH : Hadii alice lopezo Sopari, waaxda luqadaha, qaybta kaalmada adesaulyada, viktor winters . So [facility] [phone].    ATENCIÓN: Si habla español, tiene a mc disposición servicios gratuitos de asistencia lingüística. Llame al [phone].    We comply with applicable federal civil rights laws and [place] laws. We do not discriminate on the basis of race, color, national origin, age, disability, sex, sexual orientation, or gender identity.            Thank you!     Thank you for choosing [facility]  for your care. Our goal is always to provide you with excellent care. Hearing back from our patients is one way we can continue to improve our services. Please take a few minutes to complete the written survey that you may receive in the mail after your visit with us. Thank you!             Your Updated Medication List - Protect others around you: Learn how to safely use, store and throw away your medicines at www.disposemymeds.org.          This list is accurate as of 6/18/18  5:47 PM.  Always use your most  recent med list.                   Brand Name Dispense Instructions for use Diagnosis    ALLERGEN IMMUNOTHERAPY PRESCRIPTION     5 mL    Dust Mites DF 30,000AU/mL, HS  0.3 ml Dust Mites DP. 30,000 AU/mL, HS  0.3 ml  Jose, White 1:20 w/v, HS  1.0 ml Lamb's Quarters 1:20 w/v, HS 1.0 ml Diluent: HSA qs to 5ml    Allergic rhinitis due to dust mite       azelastine 0.1 % spray    ASTELIN    1 Bottle    Spray 2 sprays into both nostrils 2 times daily    Chronic seasonal allergic rhinitis due to pollen       cetirizine 10 MG tablet    zyrTEC    30 tablet    Take 1 tablet (10 mg) by mouth daily    Chronic seasonal allergic rhinitis due to pollen       EPINEPHrine 0.3 MG/0.3ML injection 2-pack    EPIPEN/ADRENACLICK/or ANY BX GENERIC EQUIV    0.6 mL    Inject 0.3 mLs (0.3 mg) into the muscle as needed for anaphylaxis    Need for desensitization to allergens       * fluticasone 50 MCG/ACT spray    FLONASE     Spray 2 sprays into both nostrils daily        * fluticasone 50 MCG/ACT spray    FLONASE    1 Bottle    Spray 2 sprays into both nostrils 2 times daily Office visit for further refills    Chronic seasonal allergic rhinitis due to pollen       * SINGULAIR PO           * montelukast 10 MG tablet    SINGULAIR    30 tablet    Take 1 tablet (10 mg) by mouth At Bedtime Office visit for further refills.    Chronic seasonal allergic rhinitis due to pollen       * Notice:  This list has 4 medication(s) that are the same as other medications prescribed for you. Read the directions carefully, and ask your doctor or other care provider to review them with you.

## 2018-06-18 NOTE — PATIENT INSTRUCTIONS
Allergy Staff Appt Hours Shot Hours Locations    Physician     Ray Mcleod DO       Support Staff     Aparna WESTFALL, RN      Fariba WESTFALL, [facility]  Monday:                      [place] 8-7     Tuesday:         [place] 8-5     Wednesday:        [place]: 7-5     Friday:        [place] 7-5   [place]        Monday: 9-5:50        Wednesday: 2-5:50        Friday: 7-12:50     [place]        Tuesday: 7-10:50        Thursday: 1:30-6:30        Friday: 8:00-3:50     [place] Monday: 7:10-4:50        Tuesday: 12:30-6:30        Thursday: 7-11:50 [facility]  [address] [place] [address]  Appt Line: [phone]  Allergy RN (Monday):  [phone]    [facility]  [address] [place] [address]  Appt Line: [phone]  Allergy RN (Tues & Wed):  [phone]    [facility]  [address] [place] [address]  Appt Line: [phone]  Allergy RN (Friday):  [phone]       Important Scheduling Information  Aspirin Desensitization: Appt will last 2 clinic days. Please call the Allergy RN line for your clinic to schedule. Discontinue antihistamines 7 days prior to the appointment.     Food Challenges: Appt will last 3-4 hours. Please call the Allergy RN line for your clinic to schedule. Discontinue antihistamines 7 days prior to the appointment.     Penicillin Testing: Appt will last 2-3 hours. Please call the Allergy RN line for your clinic to schedule. Discontinue antihistamines 7 days prior to the appointment.     Skin Testing: Appt will about 40 minutes. Call the appointment line for your clinic to schedule. Discontinue antihistamines 7 days prior to the appointment.     Venom Testing: Appt will last 2-3 hours. Please call the Allergy RN line for your clinic to schedule. Discontinue antihistamines 7 days prior to the appointment.     Thank you for trusting us with your Allergy, Asthma, and Immunology care. Please feel free to contact us with any questions or concerns you may  have.

## 2018-06-18 NOTE — ASSESSMENT & PLAN NOTE
Perennial nasal and ocular symptoms for the last 11 years. Flonase has been used and non-beneficial. Zyrtec, montelukast and other oral antihistamines have been non-beneficial. Using refresh eyedrops.  Using that a netti pot and this has been beneficial.       Tolerated cluster immunotherapy today.       - Flonase 50mcg 2 sprays/nostril q day.   - Azelastine 2 sprays/nostril twice daily as needed.   - Cetirizine (Zyrtec) 10mg by mouth daily.   - On allergen immunotherapy and tolerating. Continue traditional immunotherapy. Finished cluster immunotherapy.

## 2018-06-18 NOTE — LETTER
6/18/2018         RE: Roxanna Tao  [address] [facility] [address]        Dear Colleague,    Thank you for referring your patient, Roxanna Tao, to the [facility]. Please see a copy of my visit note below.    Roxanna Tao is a 38 year old  male with previous medical history significant for allergic rhinitis who returns for a follow up visit. Patient presents today for cluster immunotherapy. The patient is currently in a good state of health. No recent fevers, chills, cough, wheezing, shortness of breath, skin rash, angioedema, nausea, vomiting or diarrhea. The risks and benefits were discussed and the patient/patient's family wishes to proceed. The consent was signed.    History reviewed. No pertinent past medical history.  History reviewed. No pertinent family history.  History reviewed. No pertinent surgical history.    REVIEW OF SYSTEMS:  General: negative for weight gain. negative for weight loss. negative for changes in sleep.   Ears: negative for fullness. negative for hearing loss. negative for dizziness.   Nose: negative for snoring.negative for changes in smell. negative for drainage.   Throat: negative for hoarseness. negative for sore throat. negative for trouble swallowing.   Lungs: negative for shortness of breath.negative for wheezing. negative for sputum production.   Cardiovascular: negative for chest pain. negative for swelling of ankles. negative for fast or irregular heartbeat.   Gastrointestinal: negative for nausea. negative for heartburn. negative for acid reflux.   Musculoskeletal: negative for joint pain. negative for joint stiffness. negative for joint swelling.   Neurologic: negative for seizures. negative for fainting. negative for weakness.   Psychiatric: negative for changes in mood. negative for anxiety.   Endocrine: negative for cold intolerance. negative for heat intolerance. negative for tremors.   Hematologic: negative for easy  bruising. negative for easy bleeding.  Integumentary: negative for rash. negative for scaling. negative for nail changes.       Current Outpatient Prescriptions:      azelastine (ASTELIN) 0.1 % spray, Spray 2 sprays into both nostrils 2 times daily, Disp: 1 Bottle, Rfl: 3     cetirizine (ZYRTEC) 10 MG tablet, Take 1 tablet (10 mg) by mouth daily, Disp: 30 tablet, Rfl: 11     EPINEPHrine (EPIPEN/ADRENACLICK/OR ANY BX GENERIC EQUIV) 0.3 MG/0.3ML injection 2-pack, Inject 0.3 mLs (0.3 mg) into the muscle as needed for anaphylaxis, Disp: 0.6 mL, Rfl: 0     fluticasone (FLONASE) 50 MCG/ACT spray, Spray 2 sprays into both nostrils 2 times daily Office visit for further refills, Disp: 1 Bottle, Rfl: 0     fluticasone (FLONASE) 50 MCG/ACT spray, Spray 2 sprays into both nostrils daily, Disp: , Rfl:      montelukast (SINGULAIR) 10 MG tablet, Take 1 tablet (10 mg) by mouth At Bedtime Office visit for further refills., Disp: 30 tablet, Rfl: 11     Montelukast Sodium (SINGULAIR PO), , Disp: , Rfl:      ORDER FOR ALLERGEN IMMUNOTHERAPY, Dust Mites DF 30,000AU/mL, HS  0.3 ml Dust Mites DP. 30,000 AU/mL, HS  0.3 ml  Jose, White 1:20 w/v, HS  1.0 ml Lamb's Quarters 1:20 w/v, HS 1.0 ml Diluent: HSA qs to 5ml, Disp: 5 mL, Rfl: prn    There is no immunization history on file for this patient.  No Known Allergies      EXAM:   Constitutional:  Appears well-developed and well-nourished. No distress.   HEENT:   Head: Normocephalic.   Mouth/Throat: No oropharyngeal exudate present.   No cobblestoning of posterior oropharynx.   Nasal tissue pink and normal appearing.  No rhinorrhea noted.    Eyes: Conjunctivae are non-erythematous   Cardiovascular: Normal rate, regular rhythm and normal heart sounds. Exam reveals no gallop and no friction rub.   No murmur heard.  Respiratory: Effort normal and breath sounds normal. No respiratory distress. No wheezes. No rales.   Musculoskeletal: Normal range of motion.   Neuro: Oriented to person, place, and  time.  Skin: Skin is warm and dry. No rash noted.   Psychiatric: Normal mood and affect.     Nursing note and vitals reviewed.      WORKUP:   Cluster immunotherapy   The patient received red 0.3, red 0.40. Each dose was  by 30 minutes. Full report will be scanned into electronic medical record. The patient was in office for over 1.0 hours.    ASSESSMENT/PLAN:  Problem List Items Addressed This Visit        Respiratory    Allergic rhinitis due to dust mite     Perennial nasal and ocular symptoms for the last 11 years. Flonase has been used and non-beneficial. Zyrtec, montelukast and other oral antihistamines have been non-beneficial. Using refresh eyedrops.  Using that a netti pot and this has been beneficial.       Tolerated cluster immunotherapy today.       - Flonase 50mcg 2 sprays/nostril q day.   - Azelastine 2 sprays/nostril twice daily as needed.   - Cetirizine (Zyrtec) 10mg by mouth daily.   - On allergen immunotherapy and tolerating. Continue traditional immunotherapy. Finished cluster immunotherapy.          Relevant Orders    RAPID DESENSITIZATION    Chronic seasonal allergic rhinitis due to pollen - Primary    Relevant Orders    RAPID DESENSITIZATION          Chart documentation with Dragon Voice recognition Software. Although reviewed after completion, some words and grammatical errors may remain.    Ray Mcleod,    Allergy/Immunology  [facility] [place] [facility]        Again, thank you for allowing me to participate in the care of your patient.        Sincerely,        Ray Mcleod, DO

## 2018-06-25 ENCOUNTER — ALLIED HEALTH/NURSE VISIT (OUTPATIENT)
Dept: ALLERGY | Facility: CLINIC | Age: 38
End: 2018-06-25
Payer: COMMERCIAL

## 2018-06-25 DIAGNOSIS — J30.9 ALLERGIC RHINITIS: Primary | ICD-10-CM

## 2018-06-25 PROCEDURE — 99207 ZZC DROP WITH A PROCEDURE: CPT

## 2018-06-25 PROCEDURE — 95115 IMMUNOTHERAPY ONE INJECTION: CPT

## 2018-06-25 NOTE — MR AVS SNAPSHOT
After Visit Summary   6/25/2018    Roxanna Tao    MRN: [id number]           Patient Information     Date Of Birth          1980        Visit Information        Provider Department      6/25/2018 4:20 PM AN ALLERGY SHOTS [facility] [place]        Today's Diagnoses     Allergic rhinitis    -  1       Follow-ups after your visit        Your next 10 appointments already scheduled     Jul 09, 2018  4:20 PM CDT   Nurse Only with AN ALLERGY SHOTS   [facility] [facility] [place]    [address] [facility] [address]  [place] [address] [phone]            Jul 23, 2018  4:20 PM CDT   Nurse Only with AN ALLERGY SHOTS   [facility]    [address] [facility] [address]  [place] [address] [phone]            Aug 06, 2018  4:20 PM CDT   Nurse Only with AN ALLERGY SHOTS   [facility] [place] [facility]    [address]  [place] [address] [phone]              Who to contact     If you have questions or need follow up information about today's clinic visit or your schedule please contact [facility] directly at [phone].  Normal or non-critical lab and imaging results will be communicated to you by MyChart, letter or phone within 4 business days after the clinic has received the results. If you do not hear from us within 7 days, please contact the clinic through MyChart or phone. If you have a critical or abnormal lab result, we will notify you by phone as soon as possible.  Submit refill requests through ZapMe or call your pharmacy and they will forward the refill request to us. Please allow 3 business days for your refill to be completed.          Additional Information About Your Visit        Care EveryWhere ID     This is your Care EveryWhere ID. This could be used by other organizations to access your [place] medical records  [id number]         Blood Pressure from Last 3 Encounters:    06/18/18 112/80   06/11/18 118/79   06/04/18 127/85    Weight from Last 3 Encounters:   06/18/18 72.7 kg (160 lb 3.2 oz)   06/04/18 71 kg (156 lb 9.6 oz)   05/21/18 70.8 kg (156 lb)              We Performed the Following     Allergy Shot: One injection        Primary Care Provider Office Phone # Fax #    [facility] [phone] [address] [facility] [address]        Equal Access to Services     [facility] : Hadii aad ku hadasho Soomaali, waaxda luqadaha, qaybta kaalmada adeegyada, viktor winters . So [facility] [phone].    ATENCIÓN: Si habla español, tiene a mc disposición servicios gratuitos de asistencia lingüística. [facility] [phone].    We comply with applicable federal civil rights laws and [place] laws. We do not discriminate on the basis of race, color, national origin, age, disability, sex, sexual orientation, or gender identity.            Thank you!     Thank you for choosing [facility]  for your care. Our goal is always to provide you with excellent care. Hearing back from our patients is one way we can continue to improve our services. Please take a few minutes to complete the written survey that you may receive in the mail after your visit with us. Thank you!             Your Updated Medication List - Protect others around you: Learn how to safely use, store and throw away your medicines at www.disposemymeds.org.          This list is accurate as of 6/25/18  4:56 PM.  Always use your most recent med list.                   Brand Name Dispense Instructions for use Diagnosis    ALLERGEN IMMUNOTHERAPY PRESCRIPTION     5 mL    Dust Mites DF 30,000AU/mL, HS  0.3 ml Dust Mites DP. 30,000 AU/mL, HS  0.3 ml  Jose, White 1:20 w/v, HS  1.0 ml Lamb's Quarters 1:20 w/v, HS 1.0 ml Diluent: HSA qs to 5ml    Allergic rhinitis due to dust mite       azelastine 0.1 % spray    ASTELIN    1 Bottle    Spray 2 sprays into both nostrils 2  times daily    Chronic seasonal allergic rhinitis due to pollen       cetirizine 10 MG tablet    zyrTEC    30 tablet    Take 1 tablet (10 mg) by mouth daily    Chronic seasonal allergic rhinitis due to pollen       EPINEPHrine 0.3 MG/0.3ML injection 2-pack    EPIPEN/ADRENACLICK/or ANY BX GENERIC EQUIV    0.6 mL    Inject 0.3 mLs (0.3 mg) into the muscle as needed for anaphylaxis    Need for desensitization to allergens       * fluticasone 50 MCG/ACT spray    FLONASE     Spray 2 sprays into both nostrils daily        * fluticasone 50 MCG/ACT spray    FLONASE    1 Bottle    Spray 2 sprays into both nostrils 2 times daily Office visit for further refills    Chronic seasonal allergic rhinitis due to pollen       * SINGULAIR PO           * montelukast 10 MG tablet    SINGULAIR    30 tablet    Take 1 tablet (10 mg) by mouth At Bedtime Office visit for further refills.    Chronic seasonal allergic rhinitis due to pollen       * Notice:  This list has 4 medication(s) that are the same as other medications prescribed for you. Read the directions carefully, and ask your doctor or other care provider to review them with you.

## 2018-06-25 NOTE — PROGRESS NOTES
Patient presented after waiting 30 minutes with no reaction to allergy injections. Discharged from clinic.    Jaelyn Claire RN

## 2018-07-09 ENCOUNTER — ALLIED HEALTH/NURSE VISIT (OUTPATIENT)
Dept: ALLERGY | Facility: CLINIC | Age: 38
End: 2018-07-09
Payer: COMMERCIAL

## 2018-07-09 DIAGNOSIS — Z51.6 NEED FOR DESENSITIZATION TO ALLERGENS: ICD-10-CM

## 2018-07-09 DIAGNOSIS — J30.9 ALLERGIC RHINITIS: Primary | ICD-10-CM

## 2018-07-09 PROCEDURE — 95115 IMMUNOTHERAPY ONE INJECTION: CPT

## 2018-07-09 PROCEDURE — 99207 ZZC DROP WITH A PROCEDURE: CPT

## 2018-07-09 RX ORDER — EPINEPHRINE 0.3 MG/.3ML
0.3 INJECTION SUBCUTANEOUS PRN
Qty: 0.6 ML | Refills: 1 | Status: SHIPPED | OUTPATIENT
Start: 2018-07-09

## 2018-07-09 NOTE — MR AVS SNAPSHOT
After Visit Summary   7/9/2018    Roxanna Tao    MRN: [id number]           Patient Information     Date Of Birth          1980        Visit Information        Provider Department      7/9/2018 4:20 PM AN ALLERGY SHOTS [facility]        Today's Diagnoses     Allergic rhinitis    -  1    Need for desensitization to allergens           Follow-ups after your visit        Your next 10 appointments already scheduled     Jul 23, 2018  4:20 PM CDT   Nurse Only with AN ALLERGY SHOTS   [facility]    [address] [facility] [address] [phone]            Aug 06, 2018  4:20 PM CDT   Nurse Only with AN ALLERGY SHOTS   [facility]    [address] [facility] [address] [phone]              Who to contact     If you have questions or need follow up information about today's clinic visit or your schedule please contact [facility] directly at [phone].  Normal or non-critical lab and imaging results will be communicated to you by D-Ã[employer]mos[employer]art, letter or phone within 4 business days after the clinic has received the results. If you do not hear from us within 7 days, please contact the clinic through D-Ã[employer]art or phone. If you have a critical or abnormal lab result, we will notify you by phone as soon as possible.  Submit refill requests through Bigvest or call your pharmacy and they will forward the refill request to us. Please allow 3 business days for your refill to be completed.          Additional Information About Your Visit        Care EveryWhere ID     This is your Care EveryWhere ID. This could be used by other organizations to access your [place] medical records  [id number]         Blood Pressure from Last 3 Encounters:   06/18/18 112/80   06/11/18 118/79   06/04/18 127/85    Weight from Last 3 Encounters:   06/18/18 72.7 kg (160 lb 3.2 oz)   06/04/18 71 kg (156 lb  9.6 oz)   05/21/18 70.8 kg (156 lb)              We Performed the Following     Allergy Shot: One injection          Where to get your medicines      These medications were sent to [id number] - [place], [place] - [address]  [address] [place] [address]    Hours:  test fax sent successfully 7/31/03 kr Phone:  [phone]     EPINEPHrine 0.3 MG/0.3ML injection 2-pack          Primary Care Provider Office Phone # Fax #    [facility] [phone] [address] [facility] [address]        Equal Access to Services     RICHARD GHOSH : Hadii alice lopezo Sopari, waaxda luqadaha, qaybta kaalmada adesaulyada, viktor iwnters . So [facility] [phone].    ATENCIÓN: Si habla español, tiene a mc disposición servicios gratuitos de asistencia lingüística. Llame al [phone].    We comply with applicable federal civil rights laws and [place] laws. We do not discriminate on the basis of race, color, national origin, age, disability, sex, sexual orientation, or gender identity.            Thank you!     Thank you for choosing [facility]  for your care. Our goal is always to provide you with excellent care. Hearing back from our patients is one way we can continue to improve our services. Please take a few minutes to complete the written survey that you may receive in the mail after your visit with us. Thank you!             Your Updated Medication List - Protect others around you: Learn how to safely use, store and throw away your medicines at www.disposemymeds.org.          This list is accurate as of 7/9/18  5:12 PM.  Always use your most recent med list.                   Brand Name Dispense Instructions for use Diagnosis    ALLERGEN IMMUNOTHERAPY PRESCRIPTION     5 mL    Dust Mites DF 30,000AU/mL, HS  0.3 ml Dust Mites DP. 30,000 AU/mL, HS  0.3 ml  Jose, White 1:20 w/v, HS  1.0 ml Lamb's Quarters 1:20 w/v, HS 1.0  ml Diluent: HSA qs to 5ml    Allergic rhinitis due to dust mite       azelastine 0.1 % spray    ASTELIN    1 Bottle    Spray 2 sprays into both nostrils 2 times daily    Chronic seasonal allergic rhinitis due to pollen       cetirizine 10 MG tablet    zyrTEC    30 tablet    Take 1 tablet (10 mg) by mouth daily    Chronic seasonal allergic rhinitis due to pollen       EPINEPHrine 0.3 MG/0.3ML injection 2-pack    EPIPEN/ADRENACLICK/or ANY BX GENERIC EQUIV    0.6 mL    Inject 0.3 mLs (0.3 mg) into the muscle as needed for anaphylaxis    Need for desensitization to allergens       * fluticasone 50 MCG/ACT spray    FLONASE     Spray 2 sprays into both nostrils daily        * fluticasone 50 MCG/ACT spray    FLONASE    1 Bottle    Spray 2 sprays into both nostrils 2 times daily Office visit for further refills    Chronic seasonal allergic rhinitis due to pollen       * SINGULAIR PO           * montelukast 10 MG tablet    SINGULAIR    30 tablet    Take 1 tablet (10 mg) by mouth At Bedtime Office visit for further refills.    Chronic seasonal allergic rhinitis due to pollen       * Notice:  This list has 4 medication(s) that are the same as other medications prescribed for you. Read the directions carefully, and ask your doctor or other care provider to review them with you.

## 2018-07-09 NOTE — PROGRESS NOTES
Patient's EpiPen  at the end of . Received the ok from Dr. Maria to proceed with injections. Refill sent to the pharmacy.     Patient presented after waiting 30 minutes with no reaction to allergy injections. Discharged from clinic.    Jaelyn Claire RN

## 2018-07-23 ENCOUNTER — ALLIED HEALTH/NURSE VISIT (OUTPATIENT)
Dept: ALLERGY | Facility: CLINIC | Age: 38
End: 2018-07-23
Payer: COMMERCIAL

## 2018-07-23 DIAGNOSIS — J30.9 ALLERGIC RHINITIS: Primary | ICD-10-CM

## 2018-07-23 PROCEDURE — 95115 IMMUNOTHERAPY ONE INJECTION: CPT

## 2018-07-23 PROCEDURE — 99207 ZZC DROP WITH A PROCEDURE: CPT

## 2018-07-23 NOTE — MR AVS SNAPSHOT
After Visit Summary   7/23/2018    Roxanna Tao    MRN: [id number]           Patient Information     Date Of Birth          1980        Visit Information        Provider Department      7/23/2018 11:10 AM AN ALLERGY SHOTS [facility]        Today's Diagnoses     Allergic rhinitis    -  1       Follow-ups after your visit        Your next 10 appointments already scheduled     Aug 06, 2018 11:10 AM CDT   Nurse Only with AN ALLERGY SHOTS   [facility] ([facility])    [address] [facility] [address] [phone]            Aug 07, 2018  1:00 PM CDT   New Visit with Gabriela Thompson OD   [facility]    [address] [address] [phone]              Who to contact     If you have questions or need follow up information about today's clinic visit or your schedule please contact [facility] directly at [phone].  Normal or non-critical lab and imaging results will be communicated to you by MyChart, letter or phone within 4 business days after the clinic has received the results. If you do not hear from us within 7 days, please contact the clinic through Applied Cell Technologyhart or phone. If you have a critical or abnormal lab result, we will notify you by phone as soon as possible.  Submit refill requests through WemoLab or call your pharmacy and they will forward the refill request to us. Please allow 3 business days for your refill to be completed.          Additional Information About Your Visit        Care EveryWhere ID     This is your Care EveryWhere ID. This could be used by other organizations to access your [place] medical records  [id number]         Blood Pressure from Last 3 Encounters:   06/18/18 112/80   06/11/18 118/79   06/04/18 127/85    Weight from Last 3 Encounters:   06/18/18 72.7 kg (160 lb 3.2 oz)   06/04/18 71 kg (156 lb 9.6 oz)   05/21/18 70.8 kg (156 lb)               We Performed the Following     Allergy Shot: One injection        Primary Care Provider Office Phone # Fax #    [facility] [phone] [address] [facility] [address]        Equal Access to Services     RICHARD GHOSH : Hadii alice ku jessicao Sosurinderali, waaxda luqadaha, qaybta kaalmada adeegyada, viktor charlesn verito fan singh bennett. So [facility] [phone].    ATENCIÓN: Si habla español, tiene a mc disposición servicios gratuitos de asistencia lingüística. Llame al [phone].    We comply with applicable federal civil rights laws and [place] laws. We do not discriminate on the basis of race, color, national origin, age, disability, sex, sexual orientation, or gender identity.            Thank you!     Thank you for choosing [facility]  for your care. Our goal is always to provide you with excellent care. Hearing back from our patients is one way we can continue to improve our services. Please take a few minutes to complete the written survey that you may receive in the mail after your visit with us. Thank you!             Your Updated Medication List - Protect others around you: Learn how to safely use, store and throw away your medicines at www.disposemymeds.org.          This list is accurate as of 7/23/18 11:48 AM.  Always use your most recent med list.                   Brand Name Dispense Instructions for use Diagnosis    ALLERGEN IMMUNOTHERAPY PRESCRIPTION     5 mL    Dust Mites DF 30,000AU/mL, HS  0.3 ml Dust Mites DP. 30,000 AU/mL, HS  0.3 ml  Jose, White 1:20 w/v, HS  1.0 ml Lamb's Quarters 1:20 w/v, HS 1.0 ml Diluent: HSA qs to 5ml    Allergic rhinitis due to dust mite       azelastine 0.1 % spray    ASTELIN    1 Bottle    Spray 2 sprays into both nostrils 2 times daily    Chronic seasonal allergic rhinitis due to pollen       cetirizine 10 MG tablet    zyrTEC    30 tablet    Take 1 tablet (10 mg) by mouth daily    Chronic seasonal allergic  rhinitis due to pollen       EPINEPHrine 0.3 MG/0.3ML injection 2-pack    EPIPEN/ADRENACLICK/or ANY BX GENERIC EQUIV    0.6 mL    Inject 0.3 mLs (0.3 mg) into the muscle as needed for anaphylaxis    Need for desensitization to allergens       * fluticasone 50 MCG/ACT spray    FLONASE     Spray 2 sprays into both nostrils daily        * fluticasone 50 MCG/ACT spray    FLONASE    1 Bottle    Spray 2 sprays into both nostrils 2 times daily Office visit for further refills    Chronic seasonal allergic rhinitis due to pollen       * SINGULAIR PO           * montelukast 10 MG tablet    SINGULAIR    30 tablet    Take 1 tablet (10 mg) by mouth At Bedtime Office visit for further refills.    Chronic seasonal allergic rhinitis due to pollen       * Notice:  This list has 4 medication(s) that are the same as other medications prescribed for you. Read the directions carefully, and ask your doctor or other care provider to review them with you.

## 2018-08-06 ENCOUNTER — ALLIED HEALTH/NURSE VISIT (OUTPATIENT)
Dept: ALLERGY | Facility: CLINIC | Age: 38
End: 2018-08-06
Payer: COMMERCIAL

## 2018-08-06 DIAGNOSIS — J30.89 ALLERGIC RHINITIS DUE TO DUST MITE: ICD-10-CM

## 2018-08-06 DIAGNOSIS — J30.9 ALLERGIC RHINITIS: Primary | ICD-10-CM

## 2018-08-06 PROCEDURE — 95115 IMMUNOTHERAPY ONE INJECTION: CPT

## 2018-08-06 PROCEDURE — 99207 ZZC DROP WITH A PROCEDURE: CPT

## 2018-08-06 NOTE — MR AVS SNAPSHOT
After Visit Summary   8/6/2018    Roxanna Tao    MRN: [id number]           Patient Information     Date Of Birth          1980        Visit Information        Provider Department      8/6/2018 11:10 AM AN ALLERGY SHOTS [facility] [place]        Today's Diagnoses     Allergic rhinitis    -  1       Follow-ups after your visit        Your next 10 appointments already scheduled     Aug 07, 2018  1:00 PM CDT   New Visit with Gabriela Thompson OD   [facility] [place] ([facility] [place])    [address]  [place] [address]   [phone]            Sep 07, 2018 10:50 AM CDT   Nurse Only with AN ALLERGY SHOTS   [facility] [place] ([facility] [place])    [address]  [place] [address]   [phone]            Sep 17, 2018 11:00 AM CDT   Nurse Only with AN ALLERGY SHOTS   [facility] [place] ([facility] [place])    [address]  [place] [address]   [phone]            Oct 01, 2018 11:10 AM CDT   Nurse Only with AN ALLERGY SHOTS   [facility] [place] ([facility] [place])    [address]  [place] [address]   [phone]              Who to contact     If you have questions or need follow up information about today's clinic visit or your schedule please contact [facility] directly at [phone].  Normal or non-critical lab and imaging results will be communicated to you by MyChart, letter or phone within 4 business days after the clinic has received the results. If you do not hear from us within 7 days, please contact the clinic through MyChart or phone. If you have a critical or abnormal lab result, we will notify you by phone as soon as possible.  Submit refill requests through dotloop or call your pharmacy and they will forward the refill request to us. Please allow 3 business days for your refill to be completed.          Additional Information About Your Visit        Care EveryWhere  ID     This is your Care EveryWhere ID. This could be used by other organizations to access your [place] medical records  [id number]         Blood Pressure from Last 3 Encounters:   06/18/18 112/80   06/11/18 118/79   06/04/18 127/85    Weight from Last 3 Encounters:   06/18/18 72.7 kg (160 lb 3.2 oz)   06/04/18 71 kg (156 lb 9.6 oz)   05/21/18 70.8 kg (156 lb)              We Performed the Following     Allergy Shot: One injection        Primary Care Provider Office Phone # Fax #    [facility] [phone] [address] [facility] [address]        Equal Access to Services     RICHARD GHOSH : Hadii aad ku hadasho Sopari, waaxda luqadaha, qaybta kaalmada adeegyada, viktor bennett. So [facility] [phone].    ATENCIÓN: Si habla español, tiene a mc disposición servicios gratuitos de asistencia lingüística. Ll[facility] [phone].    We comply with applicable federal civil rights laws and [place] laws. We do not discriminate on the basis of race, color, national origin, age, disability, sex, sexual orientation, or gender identity.            Thank you!     Thank you for choosing [facility]  for your care. Our goal is always to provide you with excellent care. Hearing back from our patients is one way we can continue to improve our services. Please take a few minutes to complete the written survey that you may receive in the mail after your visit with us. Thank you!             Your Updated Medication List - Protect others around you: Learn how to safely use, store and throw away your medicines at www.disposemymeds.org.          This list is accurate as of 8/6/18 11:53 AM.  Always use your most recent med list.                   Brand Name Dispense Instructions for use Diagnosis    ALLERGEN IMMUNOTHERAPY PRESCRIPTION     5 mL    Dust Mites DF 30,000AU/mL, HS  0.3 ml Dust Mites DP. 30,000 AU/mL, HS  0.3 ml  Jose, White 1:20 w/v, HS  1.0 ml  Lamb's Quarters 1:20 w/v, HS 1.0 ml Diluent: HSA qs to 5ml    Allergic rhinitis due to dust mite       azelastine 0.1 % spray    ASTELIN    1 Bottle    Spray 2 sprays into both nostrils 2 times daily    Chronic seasonal allergic rhinitis due to pollen       cetirizine 10 MG tablet    zyrTEC    30 tablet    Take 1 tablet (10 mg) by mouth daily    Chronic seasonal allergic rhinitis due to pollen       EPINEPHrine 0.3 MG/0.3ML injection 2-pack    EPIPEN/ADRENACLICK/or ANY BX GENERIC EQUIV    0.6 mL    Inject 0.3 mLs (0.3 mg) into the muscle as needed for anaphylaxis    Need for desensitization to allergens       * fluticasone 50 MCG/ACT spray    FLONASE     Spray 2 sprays into both nostrils daily        * fluticasone 50 MCG/ACT spray    FLONASE    1 Bottle    Spray 2 sprays into both nostrils 2 times daily Office visit for further refills    Chronic seasonal allergic rhinitis due to pollen       * SINGULAIR PO           * montelukast 10 MG tablet    SINGULAIR    30 tablet    Take 1 tablet (10 mg) by mouth At Bedtime Office visit for further refills.    Chronic seasonal allergic rhinitis due to pollen       * Notice:  This list has 4 medication(s) that are the same as other medications prescribed for you. Read the directions carefully, and ask your doctor or other care provider to review them with you.

## 2018-08-06 NOTE — TELEPHONE ENCOUNTER
ALLERGY SOLUTION RE-ORDER REQUEST    Roxanna Tao 1980 MRN: [id number]    DATE NEEDED:  8/27/2018  Vial Color Content   Top Dose       Last Dose     Vial Size  Red 1:1 Dust Mite, Trees, Weeds  Red 1:1 0.5       Red 1:10.5     5ml        Serum reorder consent signed and patient/parent was advised that new serums would be ordered through the pharmacy and billed to their insurance company when they arrive in clinic. Yes    Shot Clinic Location:  [facility]  Ship to Location: [facility]  Serum billed to:  [facility]    Special Instructions:        Updated Prescription Needed: No      Requester Signature  Rita Lemus

## 2018-08-10 DIAGNOSIS — J30.2 SEASONAL ALLERGIC RHINITIS: Primary | ICD-10-CM

## 2018-08-10 PROCEDURE — 95165 ANTIGEN THERAPY SERVICES: CPT | Performed by: ALLERGY & IMMUNOLOGY

## 2018-08-10 NOTE — PROGRESS NOTES
Allergy serums billed at [place].     Vials received below:    Vial Color Content                      Vial Size Expiration Date  Red 1:1 Dust Mite, Trees, Weeds 5mL  8/7/2019      Original Refill encounter date: 8/6/2018      Signature  Rita Lemus

## 2018-08-10 NOTE — TELEPHONE ENCOUNTER
Allergy serums received at [place].     Vials received below:    Vial Color Content                      Vial Size Expiration Date  Red 1:1 Dust Mite, Trees, Weeds 5mL  8/7/2019        Signature  Rita Lemus

## 2018-09-07 ENCOUNTER — ALLIED HEALTH/NURSE VISIT (OUTPATIENT)
Dept: ALLERGY | Facility: CLINIC | Age: 38
End: 2018-09-07
Payer: COMMERCIAL

## 2018-09-07 DIAGNOSIS — J30.9 ALLERGIC RHINITIS: Primary | ICD-10-CM

## 2018-09-07 PROCEDURE — 99207 ZZC DROP WITH A PROCEDURE: CPT

## 2018-09-07 PROCEDURE — 95115 IMMUNOTHERAPY ONE INJECTION: CPT

## 2018-09-12 DIAGNOSIS — J30.1 CHRONIC SEASONAL ALLERGIC RHINITIS DUE TO POLLEN: ICD-10-CM

## 2018-09-12 RX ORDER — FLUTICASONE PROPIONATE 50 MCG
2 SPRAY, SUSPENSION (ML) NASAL 2 TIMES DAILY
Qty: 1 BOTTLE | Refills: 0 | Status: SHIPPED | OUTPATIENT
Start: 2018-09-12 | End: 2019-05-31

## 2018-09-17 ENCOUNTER — ALLIED HEALTH/NURSE VISIT (OUTPATIENT)
Dept: ALLERGY | Facility: CLINIC | Age: 38
End: 2018-09-17
Payer: COMMERCIAL

## 2018-09-17 DIAGNOSIS — J30.9 ALLERGIC RHINITIS: Primary | ICD-10-CM

## 2018-09-17 PROCEDURE — 95115 IMMUNOTHERAPY ONE INJECTION: CPT

## 2018-09-17 PROCEDURE — 99207 ZZC DROP WITH A PROCEDURE: CPT

## 2018-09-17 NOTE — MR AVS SNAPSHOT
"              After Visit Summary   9/17/2018    Roxanna Tao    MRN: [id number]           Patient Information     Date Of Birth          1980        Visit Information        Provider Department      9/17/2018 11:00 AM AN ALLERGY SHOTS [facility]        Today's Diagnoses     Allergic rhinitis    -  1       Follow-ups after your visit        Your next 10 appointments already scheduled     Oct 01, 2018 11:10 AM CDT   Nurse Only with AN ALLERGY SHOTS   [facility] ([facility]    [address] [facility] [address] [phone]              Who to contact     If you have questions or need follow up information about today's clinic visit or your schedule please contact [facility] [place] directly at [phone].  Normal or non-critical lab and imaging results will be communicated to you by MyChart, letter or phone within 4 business days after the clinic has received the results. If you do not hear from us within 7 days, please contact the clinic through GameSaladhart or phone. If you have a critical or abnormal lab result, we will notify you by phone as soon as possible.  Submit refill requests through Duck Duck Moose or call your pharmacy and they will forward the refill request to us. Please allow 3 business days for your refill to be completed.          Additional Information About Your Visit        GameSaladhart Information     Duck Duck Moose lets you send messages to your doctor, view your test results, renew your prescriptions, schedule appointments and more. To sign up, go to [email or web address] . Click on \"Log in\" on the left side of the screen, which will take you to the Welcome page. Then click on \"Sign up Now\" on the right side of the page.     You will be asked to enter the access code listed below, as well as some personal information. Please follow the directions to create your username and password.     Your access code is: [email or web address]  Expires: 12/6/2018 10:40 " AM     Your access code will  in 90 days. If you need help or a new code, please call your [place] clinic or [phone].        Care EveryWhere ID     This is your Care EveryWhere ID. This could be used by other organizations to access your [place] medical records  [id number]         Blood Pressure from Last 3 Encounters:   18 112/80   18 118/79   18 127/85    Weight from Last 3 Encounters:   18 72.7 kg (160 lb 3.2 oz)   18 71 kg (156 lb 9.6 oz)   18 70.8 kg (156 lb)              We Performed the Following     Allergy Shot: One injection        Primary Care Provider Office Phone # Fax #    [facility] [phone] [address] [facility] [address]        Equal Access to Services     RICHARD GHOSH : Hadii aad ku hadasho Soomaali, waaxda luqadaha, qaybta kaalmada adeegyada, waxay lisain hayaan verito winters . So [facility] [phone].    ATENCIÓN: Si habla español, tiene a mc disposición servicios gratuitos de asistencia lingüística. Llame al [phone].    We comply with applicable federal civil rights laws and [place] laws. We do not discriminate on the basis of race, color, national origin, age, disability, sex, sexual orientation, or gender identity.            Thank you!     Thank you for choosing [facility]  for your care. Our goal is always to provide you with excellent care. Hearing back from our patients is one way we can continue to improve our services. Please take a few minutes to complete the written survey that you may receive in the mail after your visit with us. Thank you!             Your Updated Medication List - Protect others around you: Learn how to safely use, store and throw away your medicines at www.disposemymeds.org.          This list is accurate as of 18 11:34 AM.  Always use your most recent med list.                   Brand Name Dispense Instructions for use Diagnosis    azelastine  0.1 % nasal spray    ASTELIN    1 Bottle    Spray 2 sprays into both nostrils 2 times daily    Chronic seasonal allergic rhinitis due to pollen       cetirizine 10 MG tablet    zyrTEC    30 tablet    Take 1 tablet (10 mg) by mouth daily    Chronic seasonal allergic rhinitis due to pollen       EPINEPHrine 0.3 MG/0.3ML injection 2-pack    EPIPEN/ADRENACLICK/or ANY BX GENERIC EQUIV    0.6 mL    Inject 0.3 mLs (0.3 mg) into the muscle as needed for anaphylaxis    Need for desensitization to allergens       * fluticasone 50 MCG/ACT spray    FLONASE     Spray 2 sprays into both nostrils daily        * fluticasone 50 MCG/ACT spray    FLONASE    1 Bottle    Spray 2 sprays into both nostrils 2 times daily Office visit for further refills    Chronic seasonal allergic rhinitis due to pollen       ORDER FOR ALLERGEN IMMUNOTHERAPY 5 mL vial     5 mL    Dust Mites DF 30,000AU/mL, HS  0.3 ml Dust Mites DP. 30,000 AU/mL, HS  0.3 ml  Jose, White 1:20 w/v, HS  1.0 ml Lamb's Quarters 1:20 w/v, HS 1.0 ml Diluent: HSA qs to 5ml    Allergic rhinitis due to dust mite       * SINGULAIR PO           * montelukast 10 MG tablet    SINGULAIR    30 tablet    Take 1 tablet (10 mg) by mouth At Bedtime Office visit for further refills.    Chronic seasonal allergic rhinitis due to pollen       * Notice:  This list has 4 medication(s) that are the same as other medications prescribed for you. Read the directions carefully, and ask your doctor or other care provider to review them with you.

## 2018-10-01 ENCOUNTER — ALLIED HEALTH/NURSE VISIT (OUTPATIENT)
Dept: ALLERGY | Facility: CLINIC | Age: 38
End: 2018-10-01
Payer: COMMERCIAL

## 2018-10-01 DIAGNOSIS — J30.9 ALLERGIC RHINITIS: Primary | ICD-10-CM

## 2018-10-01 PROCEDURE — 99207 ZZC NO CHARGE NURSE ONLY: CPT

## 2018-10-01 NOTE — MR AVS SNAPSHOT
"              After Visit Summary   10/1/2018    Roxanna Tao    MRN: [id number]           Patient Information     Date Of Birth          1980        Visit Information        Provider Department      10/1/2018 11:10 AM AN ALLERGY SHOTS [facility]        Today's Diagnoses     Allergic rhinitis    -  1       Follow-ups after your visit        Your next 10 appointments already scheduled     Oct 05, 2018 11:00 AM CDT   Nurse Only with AN ALLERGY SHOTS   [facility] ([facility])    [address] [facility] [address] [phone]            Oct 08, 2018 11:00 AM CDT   Nurse Only with AN ALLERGY SHOTS   [facility] ([facility])    [address] [facility] [address] [phone]              Who to contact     If you have questions or need follow up information about today's clinic visit or your schedule please contact [facility] directly at [phone].  Normal or non-critical lab and imaging results will be communicated to you by CAILabshart, letter or phone within 4 business days after the clinic has received the results. If you do not hear from us within 7 days, please contact the clinic through [email or web address] or phone. If you have a critical or abnormal lab result, we will notify you by phone as soon as possible.  Submit refill requests through [email or web address] or call your pharmacy and they will forward the refill request to us. Please allow 3 business days for your refill to be completed.          Additional Information About Your Visit        CAILabsharGet In Information     [email or web address] lets you send messages to your doctor, view your test results, renew your prescriptions, schedule appointments and more. To sign up, go to [email or web address] . Click on \"Log in\" on the left side of the screen, which will take you to the Welcome page. Then click on \"Sign up Now\" on the right side of the page.     You will be asked to enter the access " code listed below, as well as some personal information. Please follow the directions to create your username and password.     Your access code is: [email or web address]  Expires: 2018 10:40 AM     Your access code will  in 90 days. If you need help or a new code, please call your [facility] or [phone].        Care EveryWhere ID     This is your Care EveryWhere ID. This could be used by other organizations to access your [place] medical records  [id number]         Blood Pressure from Last 3 Encounters:   18 112/80   18 118/79   18 127/85    Weight from Last 3 Encounters:   18 72.7 kg (160 lb 3.2 oz)   18 71 kg (156 lb 9.6 oz)   18 70.8 kg (156 lb)              Today, you had the following     No orders found for display       Primary Care Provider Office Phone # Fax #    [facility] [phone] [address] [facility] [address]        Equal Access to Services     RICHARD GHOSH : Hadii alice ku hadluluo Sopari, waaxda luqadaha, qaybta kaalmada katelyn, viktor winters . So [facility] [phone].    ATENCIÓN: Si habla español, tiene a mc disposición servicios gratuitos de asistencia lingüística. Ll[facility] [phone].    We comply with applicable federal civil rights laws and [place] laws. We do not discriminate on the basis of race, color, national origin, age, disability, sex, sexual orientation, or gender identity.            Thank you!     Thank you for choosing [facility]  for your care. Our goal is always to provide you with excellent care. Hearing back from our patients is one way we can continue to improve our services. Please take a few minutes to complete the written survey that you may receive in the mail after your visit with us. Thank you!             Your Updated Medication List - Protect others around you: Learn how to safely use, store and throw away your medicines at  www.disposemymeds.org.          This list is accurate as of 10/1/18 11:11 AM.  Always use your most recent med list.                   Brand Name Dispense Instructions for use Diagnosis    azelastine 0.1 % nasal spray    ASTELIN    1 Bottle    Spray 2 sprays into both nostrils 2 times daily    Chronic seasonal allergic rhinitis due to pollen       cetirizine 10 MG tablet    zyrTEC    30 tablet    Take 1 tablet (10 mg) by mouth daily    Chronic seasonal allergic rhinitis due to pollen       EPINEPHrine 0.3 MG/0.3ML injection 2-pack    EPIPEN/ADRENACLICK/or ANY BX GENERIC EQUIV    0.6 mL    Inject 0.3 mLs (0.3 mg) into the muscle as needed for anaphylaxis    Need for desensitization to allergens       * fluticasone 50 MCG/ACT spray    FLONASE     Spray 2 sprays into both nostrils daily        * fluticasone 50 MCG/ACT spray    FLONASE    1 Bottle    Spray 2 sprays into both nostrils 2 times daily Office visit for further refills    Chronic seasonal allergic rhinitis due to pollen       ORDER FOR ALLERGEN IMMUNOTHERAPY 5 mL vial     5 mL    Dust Mites DF 30,000AU/mL, HS  0.3 ml Dust Mites DP. 30,000 AU/mL, HS  0.3 ml  Jose, White 1:20 w/v, HS  1.0 ml Lamb's Quarters 1:20 w/v, HS 1.0 ml Diluent: HSA qs to 5ml    Allergic rhinitis due to dust mite       * SINGULAIR PO           * montelukast 10 MG tablet    SINGULAIR    30 tablet    Take 1 tablet (10 mg) by mouth At Bedtime Office visit for further refills.    Chronic seasonal allergic rhinitis due to pollen       * Notice:  This list has 4 medication(s) that are the same as other medications prescribed for you. Read the directions carefully, and ask your doctor or other care provider to review them with you.

## 2018-10-05 ENCOUNTER — ALLIED HEALTH/NURSE VISIT (OUTPATIENT)
Dept: ALLERGY | Facility: CLINIC | Age: 38
End: 2018-10-05
Payer: COMMERCIAL

## 2018-10-05 DIAGNOSIS — J30.9 ALLERGIC RHINITIS: Primary | ICD-10-CM

## 2018-10-05 PROCEDURE — 99207 ZZC DROP WITH A PROCEDURE: CPT

## 2018-10-05 PROCEDURE — 95115 IMMUNOTHERAPY ONE INJECTION: CPT

## 2018-10-05 NOTE — MR AVS SNAPSHOT
"              After Visit Summary   10/5/2018    Roxanna Tao    MRN: [id number]           Patient Information     Date Of Birth          1980        Visit Information        Provider Department      10/5/2018 11:00 AM AN ALLERGY SHOTS [facility]        Today's Diagnoses     Allergic rhinitis    -  1       Follow-ups after your visit        Your next 10 appointments already scheduled     Oct 08, 2018 11:00 AM CDT   Nurse Only with AN ALLERGY SHOTS   [facility]    [address] [place] [address] [phone]              Who to contact     If you have questions or need follow up information about today's clinic visit or your schedule please contact [facility] directly at [phone].  Normal or non-critical lab and imaging results will be communicated to you by MyChart, letter or phone within 4 business days after the clinic has received the results. If you do not hear from us within 7 days, please contact the clinic through Qwilrhart or phone. If you have a critical or abnormal lab result, we will notify you by phone as soon as possible.  Submit refill requests through CellAegis Devices or call your pharmacy and they will forward the refill request to us. Please allow 3 business days for your refill to be completed.          Additional Information About Your Visit        Qwilrhart Information     [employer] lets you send messages to your doctor, view your test results, renew your prescriptions, schedule appointments and more. To sign up, go to [email or web address] . Click on \"Log in\" on the left side of the screen, which will take you to the Welcome page. Then click on \"Sign up Now\" on the right side of the page.     You will be asked to enter the access code listed below, as well as some personal information. Please follow the directions to create your username and password.     Your access code is: [email or web address]  Expires: 12/6/2018 10:40 " AM     Your access code will  in 90 days. If you need help or a new code, please call your [place] clinic or [phone].        Care EveryWhere ID     This is your Care EveryWhere ID. This could be used by other organizations to access your [place] medical records  [id number]         Blood Pressure from Last 3 Encounters:   18 112/80   18 118/79   18 127/85    Weight from Last 3 Encounters:   18 72.7 kg (160 lb 3.2 oz)   18 71 kg (156 lb 9.6 oz)   18 70.8 kg (156 lb)              We Performed the Following     Allergy Shot: One injection        Primary Care Provider Office Phone # Fax #    [facility] [phone] [address] [facility] [address]        Equal Access to Services     RICHARD GHOSH : Hadii aad ku hadasho Soomaali, waaxda luqadaha, qaybta kaalmada adeegyada, waxay lisain hayaan verito winters . So [facility] [phone].    ATENCIÓN: Si habla español, tiene a mc disposición servicios gratuitos de asistencia lingüística. Llame al [phone].    We comply with applicable federal civil rights laws and [place] laws. We do not discriminate on the basis of race, color, national origin, age, disability, sex, sexual orientation, or gender identity.            Thank you!     Thank you for choosing [facility]  for your care. Our goal is always to provide you with excellent care. Hearing back from our patients is one way we can continue to improve our services. Please take a few minutes to complete the written survey that you may receive in the mail after your visit with us. Thank you!             Your Updated Medication List - Protect others around you: Learn how to safely use, store and throw away your medicines at www.disposemymeds.org.          This list is accurate as of 10/5/18 12:25 PM.  Always use your most recent med list.                   Brand Name Dispense Instructions for use Diagnosis    azelastine  0.1 % nasal spray    ASTELIN    1 Bottle    Spray 2 sprays into both nostrils 2 times daily    Chronic seasonal allergic rhinitis due to pollen       cetirizine 10 MG tablet    zyrTEC    30 tablet    Take 1 tablet (10 mg) by mouth daily    Chronic seasonal allergic rhinitis due to pollen       EPINEPHrine 0.3 MG/0.3ML injection 2-pack    EPIPEN/ADRENACLICK/or ANY BX GENERIC EQUIV    0.6 mL    Inject 0.3 mLs (0.3 mg) into the muscle as needed for anaphylaxis    Need for desensitization to allergens       * fluticasone 50 MCG/ACT spray    FLONASE     Spray 2 sprays into both nostrils daily        * fluticasone 50 MCG/ACT spray    FLONASE    1 Bottle    Spray 2 sprays into both nostrils 2 times daily Office visit for further refills    Chronic seasonal allergic rhinitis due to pollen       ORDER FOR ALLERGEN IMMUNOTHERAPY 5 mL vial     5 mL    Dust Mites DF 30,000AU/mL, HS  0.3 ml Dust Mites DP. 30,000 AU/mL, HS  0.3 ml  Jose, White 1:20 w/v, HS  1.0 ml Lamb's Quarters 1:20 w/v, HS 1.0 ml Diluent: HSA qs to 5ml    Allergic rhinitis due to dust mite       * SINGULAIR PO           * montelukast 10 MG tablet    SINGULAIR    30 tablet    Take 1 tablet (10 mg) by mouth At Bedtime Office visit for further refills.    Chronic seasonal allergic rhinitis due to pollen       * Notice:  This list has 4 medication(s) that are the same as other medications prescribed for you. Read the directions carefully, and ask your doctor or other care provider to review them with you.

## 2018-10-08 ENCOUNTER — ALLIED HEALTH/NURSE VISIT (OUTPATIENT)
Dept: ALLERGY | Facility: CLINIC | Age: 38
End: 2018-10-08
Payer: COMMERCIAL

## 2018-10-08 DIAGNOSIS — J30.9 ALLERGIC RHINITIS: Primary | ICD-10-CM

## 2018-10-08 PROCEDURE — 95115 IMMUNOTHERAPY ONE INJECTION: CPT

## 2018-10-08 PROCEDURE — 99207 ZZC DROP WITH A PROCEDURE: CPT

## 2018-10-08 NOTE — MR AVS SNAPSHOT
"              After Visit Summary   10/8/2018    Roxanna Tao    MRN: [id number]           Patient Information     Date Of Birth          1980        Visit Information        Provider Department      10/8/2018 11:00 AM AN ALLERGY SHOTS [facility]        Today's Diagnoses     Allergic rhinitis    -  1       Follow-ups after your visit        Your next 10 appointments already scheduled     Nov 05, 2018 11:00 AM CST   Nurse Only with AN ALLERGY SHOTS   [facility] ([facility]    [address] [facility] [address] [phone]              Who to contact     If you have questions or need follow up information about today's clinic visit or your schedule please contact [facility] directly at [phone].  Normal or non-critical lab and imaging results will be communicated to you by Empiriboxhart, letter or phone within 4 business days after the clinic has received the results. If you do not hear from us within 7 days, please contact the clinic through Empiriboxhart or phone. If you have a critical or abnormal lab result, we will notify you by phone as soon as possible.  Submit refill requests through [employer] or call your pharmacy and they will forward the refill request to us. Please allow 3 business days for your refill to be completed.          Additional Information About Your Visit        Empiriboxhart Information     [employer] lets you send messages to your doctor, view your test results, renew your prescriptions, schedule appointments and more. To sign up, go to [email or web address] . Click on \"Log in\" on the left side of the screen, which will take you to the Welcome page. Then click on \"Sign up Now\" on the right side of the page.     You will be asked to enter the access code listed below, as well as some personal information. Please follow the directions to create your username and password.     Your access code is: [email or web address]  Expires: 12/6/2018 10:40 " AM     Your access code will  in 90 days. If you need help or a new code, please call your [place] clinic or [phone].        Care EveryWhere ID     This is your Care EveryWhere ID. This could be used by other organizations to access your [place] medical records  [id number]         Blood Pressure from Last 3 Encounters:   18 112/80   18 118/79   18 127/85    Weight from Last 3 Encounters:   18 72.7 kg (160 lb 3.2 oz)   18 71 kg (156 lb 9.6 oz)   18 70.8 kg (156 lb)              We Performed the Following     Allergy Shot: One injection        Primary Care Provider Office Phone # Fax #    [facility] [phone] [address] [facility] [address]        Equal Access to Services     RICHARD GHOSH : Hadii aad ku hadasho Soomaali, waaxda luqadaha, qaybta kaalmada adeegyada, waxay lisain hayaan verito winters . So [facility] [phone].    ATENCIÓN: Si habla español, tiene a mc disposición servicios gratuitos de asistencia lingüística. Llame al [phone].    We comply with applicable federal civil rights laws and [place] laws. We do not discriminate on the basis of race, color, national origin, age, disability, sex, sexual orientation, or gender identity.            Thank you!     Thank you for choosing [facility]  for your care. Our goal is always to provide you with excellent care. Hearing back from our patients is one way we can continue to improve our services. Please take a few minutes to complete the written survey that you may receive in the mail after your visit with us. Thank you!             Your Updated Medication List - Protect others around you: Learn how to safely use, store and throw away your medicines at www.disposemymeds.org.          This list is accurate as of 10/8/18 12:33 PM.  Always use your most recent med list.                   Brand Name Dispense Instructions for use Diagnosis    azelastine  0.1 % nasal spray    ASTELIN    1 Bottle    Spray 2 sprays into both nostrils 2 times daily    Chronic seasonal allergic rhinitis due to pollen       cetirizine 10 MG tablet    zyrTEC    30 tablet    Take 1 tablet (10 mg) by mouth daily    Chronic seasonal allergic rhinitis due to pollen       EPINEPHrine 0.3 MG/0.3ML injection 2-pack    EPIPEN/ADRENACLICK/or ANY BX GENERIC EQUIV    0.6 mL    Inject 0.3 mLs (0.3 mg) into the muscle as needed for anaphylaxis    Need for desensitization to allergens       * fluticasone 50 MCG/ACT spray    FLONASE     Spray 2 sprays into both nostrils daily        * fluticasone 50 MCG/ACT spray    FLONASE    1 Bottle    Spray 2 sprays into both nostrils 2 times daily Office visit for further refills    Chronic seasonal allergic rhinitis due to pollen       ORDER FOR ALLERGEN IMMUNOTHERAPY 5 mL vial     5 mL    Dust Mites DF 30,000AU/mL, HS  0.3 ml Dust Mites DP. 30,000 AU/mL, HS  0.3 ml  Jose, White 1:20 w/v, HS  1.0 ml Lamb's Quarters 1:20 w/v, HS 1.0 ml Diluent: HSA qs to 5ml    Allergic rhinitis due to dust mite       * SINGULAIR PO           * montelukast 10 MG tablet    SINGULAIR    30 tablet    Take 1 tablet (10 mg) by mouth At Bedtime Office visit for further refills.    Chronic seasonal allergic rhinitis due to pollen       * Notice:  This list has 4 medication(s) that are the same as other medications prescribed for you. Read the directions carefully, and ask your doctor or other care provider to review them with you.

## 2018-11-05 ENCOUNTER — ALLIED HEALTH/NURSE VISIT (OUTPATIENT)
Dept: ALLERGY | Facility: CLINIC | Age: 38
End: 2018-11-05
Payer: COMMERCIAL

## 2018-11-05 DIAGNOSIS — J30.9 ALLERGIC RHINITIS: Primary | ICD-10-CM

## 2018-11-05 PROCEDURE — 95115 IMMUNOTHERAPY ONE INJECTION: CPT

## 2018-11-05 NOTE — MR AVS SNAPSHOT
After Visit Summary   11/5/2018    Roxanna Tao    MRN: [id number]           Patient Information     Date Of Birth          1980        Visit Information        Provider Department      11/5/2018 11:00 AM AN ALLERGY SHOTS [facility] [place]        Today's Diagnoses     Allergic rhinitis    -  1       Follow-ups after your visit        Your next 10 appointments already scheduled     Dec 03, 2018 11:00 AM CST   Nurse Only with AN ALLERGY SHOTS   [facility] [place] ([facility] [place])    [address]  [place] [address] [phone]            Dec 31, 2018 11:00 AM CST   Nurse Only with AN ALLERGY SHOTS   [facility] [place] ([facility] [place])    [address]  [place] [address] [phone]            Jan 28, 2019 11:00 AM CST   Nurse Only with AN ALLERGY SHOTS   [facility] [place] ([facility] [place])    [address]  [place] [address] [phone]              Who to contact     If you have questions or need follow up information about today's clinic visit or your schedule please contact [facility] directly at [phone].  Normal or non-critical lab and imaging results will be communicated to you by MyChart, letter or phone within 4 business days after the clinic has received the results. If you do not hear from us within 7 days, please contact the clinic through MyChart or phone. If you have a critical or abnormal lab result, we will notify you by phone as soon as possible.  Submit refill requests through [employer] or call your pharmacy and they will forward the refill request to us. Please allow 3 business days for your refill to be completed.          Additional Information About Your Visit        Care EveryWhere ID     This is your Care EveryWhere ID. This could be used by other organizations to access your [place] medical records  [id number]         Blood Pressure from Last 3 Encounters:    06/18/18 112/80   06/11/18 118/79   06/04/18 127/85    Weight from Last 3 Encounters:   06/18/18 72.7 kg (160 lb 3.2 oz)   06/04/18 71 kg (156 lb 9.6 oz)   05/21/18 70.8 kg (156 lb)              We Performed the Following     Allergy Shot: One injection        Primary Care Provider Office Phone # Fax #    [facility] [phone] [address] [facility] [address]        Equal Access to Services     [facility] : Hadii aad ku hadasho Soomaali, waaxda luqadaha, qaybta kaalmada adeegyada, waxfede winters . So [facility] [phone].    ATENCIÓN: Si habla español, tiene a mc disposición servicios gratuitos de asistencia lingüística. [facility] [phone].    We comply with applicable federal civil rights laws and [place] laws. We do not discriminate on the basis of race, color, national origin, age, disability, sex, sexual orientation, or gender identity.            Thank you!     Thank you for choosing [facility]  for your care. Our goal is always to provide you with excellent care. Hearing back from our patients is one way we can continue to improve our services. Please take a few minutes to complete the written survey that you may receive in the mail after your visit with us. Thank you!             Your Updated Medication List - Protect others around you: Learn how to safely use, store and throw away your medicines at www.disposemymeds.org.          This list is accurate as of 11/5/18 11:38 AM.  Always use your most recent med list.                   Brand Name Dispense Instructions for use Diagnosis    azelastine 0.1 % nasal spray    ASTELIN    1 Bottle    Spray 2 sprays into both nostrils 2 times daily    Chronic seasonal allergic rhinitis due to pollen       cetirizine 10 MG tablet    zyrTEC    30 tablet    Take 1 tablet (10 mg) by mouth daily    Chronic seasonal allergic rhinitis due to pollen       EPINEPHrine 0.3 MG/0.3ML injection  2-pack    EPIPEN/ADRENACLICK/or ANY BX GENERIC EQUIV    0.6 mL    Inject 0.3 mLs (0.3 mg) into the muscle as needed for anaphylaxis    Need for desensitization to allergens       * fluticasone 50 MCG/ACT spray    FLONASE     Spray 2 sprays into both nostrils daily        * fluticasone 50 MCG/ACT spray    FLONASE    1 Bottle    Spray 2 sprays into both nostrils 2 times daily Office visit for further refills    Chronic seasonal allergic rhinitis due to pollen       ORDER FOR ALLERGEN IMMUNOTHERAPY 5 mL vial     5 mL    Dust Mites DF 30,000AU/mL, HS  0.3 ml Dust Mites DP. 30,000 AU/mL, HS  0.3 ml  Jose, White 1:20 w/v, HS  1.0 ml Lamb's Quarters 1:20 w/v, HS 1.0 ml Diluent: HSA qs to 5ml    Allergic rhinitis due to dust mite       * SINGULAIR PO           * montelukast 10 MG tablet    SINGULAIR    30 tablet    Take 1 tablet (10 mg) by mouth At Bedtime Office visit for further refills.    Chronic seasonal allergic rhinitis due to pollen       * Notice:  This list has 4 medication(s) that are the same as other medications prescribed for you. Read the directions carefully, and ask your doctor or other care provider to review them with you.

## 2018-12-05 ENCOUNTER — ALLIED HEALTH/NURSE VISIT (OUTPATIENT)
Dept: ALLERGY | Facility: CLINIC | Age: 38
End: 2018-12-05
Payer: COMMERCIAL

## 2018-12-05 DIAGNOSIS — J30.9 ALLERGIC RHINITIS: Primary | ICD-10-CM

## 2018-12-05 PROCEDURE — 99207 ZZC DROP WITH A PROCEDURE: CPT

## 2018-12-05 PROCEDURE — 95115 IMMUNOTHERAPY ONE INJECTION: CPT

## 2018-12-05 NOTE — PROGRESS NOTES
Patient presented after waiting 30 minutes with no reaction to allergy injections. Discharged from clinic.    Spike Trevino RN....12/5/2018 5:58 PM

## 2018-12-05 NOTE — MR AVS SNAPSHOT
After Visit Summary   12/5/2018    Roxanna Tao    MRN: [id number]           Patient Information     Date Of Birth          1980        Visit Information        Provider Department      12/5/2018 5:40 PM AN ALLERGY SHOTS [facility] [place]        Today's Diagnoses     Allergic rhinitis    -  1       Follow-ups after your visit        Your next 10 appointments already scheduled     Dec 31, 2018 11:00 AM CST   Nurse Only with AN ALLERGY SHOTS   [facility] [place] ([facility] [place])    [address] [facility]  [place] [address] [phone]            Jan 28, 2019 11:00 AM CST   Nurse Only with AN ALLERGY SHOTS   [facility] [place] ([facility] [place])    [address] [facility]  [place] [address] [phone]              Who to contact     If you have questions or need follow up information about today's clinic visit or your schedule please contact [facility] directly at [phone].  Normal or non-critical lab and imaging results will be communicated to you by MyChart, letter or phone within 4 business days after the clinic has received the results. If you do not hear from us within 7 days, please contact the clinic through [employer] or phone. If you have a critical or abnormal lab result, we will notify you by phone as soon as possible.  Submit refill requests through [employer] or call your pharmacy and they will forward the refill request to us. Please allow 3 business days for your refill to be completed.          Additional Information About Your Visit        Care EveryWhere ID     This is your Care EveryWhere ID. This could be used by other organizations to access your [place] medical records  [id number]         Blood Pressure from Last 3 Encounters:   06/18/18 112/80   06/11/18 118/79   06/04/18 127/85    Weight from Last 3 Encounters:   06/18/18 72.7 kg (160 lb 3.2 oz)   06/04/18 71 kg (156 lb 9.6 oz)   05/21/18 70.8 kg (156 lb)               We Performed the Following     Allergy Shot: One injection        Primary Care Provider Office Phone # Fax #    [facility] [phone] [address] [address]        Equal Access to Services     RICHARD GHOSH : Hadii aad ku hadluluo Sopari, waaxda luqadaha, qaybta kaalmada adeegyada, viktor charlesn verito fan singh bennett. So [facility] [phone].    ATENCIÓN: Si habla español, tiene a mc disposición servicios gratuitos de asistencia lingüística. Llame al [phone].    We comply with applicable federal civil rights laws and [place] laws. We do not discriminate on the basis of race, color, national origin, age, disability, sex, sexual orientation, or gender identity.            Thank you!     Thank you for choosing [facility]  for your care. Our goal is always to provide you with excellent care. Hearing back from our patients is one way we can continue to improve our services. Please take a few minutes to complete the written survey that you may receive in the mail after your visit with us. Thank you!             Your Updated Medication List - Protect others around you: Learn how to safely use, store and throw away your medicines at www.disposemymeds.org.          This list is accurate as of 12/5/18  5:58 PM.  Always use your most recent med list.                   Brand Name Dispense Instructions for use Diagnosis    azelastine 0.1 % nasal spray    ASTELIN    1 Bottle    Spray 2 sprays into both nostrils 2 times daily    Chronic seasonal allergic rhinitis due to pollen       cetirizine 10 MG tablet    zyrTEC    30 tablet    Take 1 tablet (10 mg) by mouth daily    Chronic seasonal allergic rhinitis due to pollen       EPINEPHrine 0.3 MG/0.3ML injection 2-pack    EPIPEN/ADRENACLICK/or ANY BX GENERIC EQUIV    0.6 mL    Inject 0.3 mLs (0.3 mg) into the muscle as needed for anaphylaxis    Need for desensitization to allergens       * fluticasone 50  MCG/ACT nasal spray    FLONASE     Spray 2 sprays into both nostrils daily        * fluticasone 50 MCG/ACT nasal spray    FLONASE    1 Bottle    Spray 2 sprays into both nostrils 2 times daily Office visit for further refills    Chronic seasonal allergic rhinitis due to pollen       ORDER FOR ALLERGEN IMMUNOTHERAPY 5 mL vial     5 mL    Dust Mites DF 30,000AU/mL, HS  0.3 ml Dust Mites DP. 30,000 AU/mL, HS  0.3 ml  Jose, White 1:20 w/v, HS  1.0 ml Lamb's Quarters 1:20 w/v, HS 1.0 ml Diluent: HSA qs to 5ml    Allergic rhinitis due to dust mite       * SINGULAIR PO           * montelukast 10 MG tablet    SINGULAIR    30 tablet    Take 1 tablet (10 mg) by mouth At Bedtime Office visit for further refills.    Chronic seasonal allergic rhinitis due to pollen       * Notice:  This list has 4 medication(s) that are the same as other medications prescribed for you. Read the directions carefully, and ask your doctor or other care provider to review them with you.

## 2019-02-15 ENCOUNTER — ALLIED HEALTH/NURSE VISIT (OUTPATIENT)
Dept: ALLERGY | Facility: CLINIC | Age: 39
End: 2019-02-15
Payer: COMMERCIAL

## 2019-02-15 ENCOUNTER — TELEPHONE (OUTPATIENT)
Dept: ALLERGY | Facility: CLINIC | Age: 39
End: 2019-02-15

## 2019-02-15 DIAGNOSIS — J30.9 ALLERGIC RHINITIS: Primary | ICD-10-CM

## 2019-02-15 PROCEDURE — 95115 IMMUNOTHERAPY ONE INJECTION: CPT

## 2019-02-15 PROCEDURE — 99207 ZZC DROP WITH A PROCEDURE: CPT

## 2019-02-15 NOTE — TELEPHONE ENCOUNTER
Patient received 0.5 ml in Red 1:1 vial on 12/5/18, and ordered top dose is 0.5 in Red 1:1. Patient is scheduled for an injection today. This will be 72 days after patient's last injection. Please advise new dosing orders, building schedule, and date which orders are valid through.     New orders will be added to immunotherapy flowsheet once they are received.     Jaelyn Claire RN

## 2019-02-22 ENCOUNTER — ALLIED HEALTH/NURSE VISIT (OUTPATIENT)
Dept: ALLERGY | Facility: CLINIC | Age: 39
End: 2019-02-22
Payer: COMMERCIAL

## 2019-02-22 DIAGNOSIS — J30.1 SEASONAL ALLERGIC RHINITIS DUE TO POLLEN: Primary | ICD-10-CM

## 2019-02-22 PROCEDURE — 95115 IMMUNOTHERAPY ONE INJECTION: CPT

## 2019-02-22 PROCEDURE — 99207 ZZC DROP WITH A PROCEDURE: CPT

## 2019-03-01 ENCOUNTER — ALLIED HEALTH/NURSE VISIT (OUTPATIENT)
Dept: ALLERGY | Facility: CLINIC | Age: 39
End: 2019-03-01
Payer: COMMERCIAL

## 2019-03-01 DIAGNOSIS — J30.1 SEASONAL ALLERGIC RHINITIS DUE TO POLLEN: Primary | ICD-10-CM

## 2019-03-01 PROCEDURE — 99207 ZZC DROP WITH A PROCEDURE: CPT

## 2019-03-01 PROCEDURE — 95115 IMMUNOTHERAPY ONE INJECTION: CPT

## 2019-03-15 ENCOUNTER — ALLIED HEALTH/NURSE VISIT (OUTPATIENT)
Dept: ALLERGY | Facility: CLINIC | Age: 39
End: 2019-03-15
Payer: COMMERCIAL

## 2019-03-15 DIAGNOSIS — J30.1 SEASONAL ALLERGIC RHINITIS DUE TO POLLEN: Primary | ICD-10-CM

## 2019-03-15 PROCEDURE — 99207 ZZC DROP WITH A PROCEDURE: CPT

## 2019-03-15 PROCEDURE — 95115 IMMUNOTHERAPY ONE INJECTION: CPT

## 2019-03-15 NOTE — PROGRESS NOTES
Patient presented after waiting 30 minutes with no reaction to allergy injections. Discharged from clinic.    Jackeline Rutherford RN ............   3/15/2019...10:24 AM

## 2019-05-03 ENCOUNTER — ALLIED HEALTH/NURSE VISIT (OUTPATIENT)
Dept: ALLERGY | Facility: CLINIC | Age: 39
End: 2019-05-03
Payer: COMMERCIAL

## 2019-05-03 DIAGNOSIS — J30.1 SEASONAL ALLERGIC RHINITIS DUE TO POLLEN: Primary | ICD-10-CM

## 2019-05-03 DIAGNOSIS — J30.89 ALLERGIC RHINITIS DUE TO DUST MITE: ICD-10-CM

## 2019-05-03 PROCEDURE — 95115 IMMUNOTHERAPY ONE INJECTION: CPT

## 2019-05-03 PROCEDURE — 99207 ZZC DROP WITH A PROCEDURE: CPT

## 2019-05-03 NOTE — PROGRESS NOTES
Patient presented after waiting 30 minutes with no reaction to allergy injections. Discharged from clinic.    Jackeline Rutherford RN ............   5/3/2019...11:19 AM

## 2019-05-03 NOTE — TELEPHONE ENCOUNTER
ALLERGY SOLUTION RE-ORDER REQUEST    Roxanna Tao 1980 MRN: [id number]    DATE NEEDED:  05/10/2019  Vial Color Content   Top Dose       Last Dose     Vial Size  Red 1:1 Dust Mite, Trees, Weeds   Red 1:1 0.5      Red 1:1 0.45    5ml    Serum reorder consent signed and patient/parent was advised that new serums would be ordered through the pharmacy and billed to their insurance company when they arrive in clinic. Yes    Shot Clinic Location:  [place]  Ship to Location: [place]  Serum billed to:  [facility]    Special Instructions:  N/A      Updated Prescription Needed: No      Requester Signature  Nupur Yost

## 2019-05-03 NOTE — TELEPHONE ENCOUNTER
Routing to provider - please send new prescription for serum to compounding pharmacy.  Thank you.      Aparna Alex RN

## 2019-05-09 DIAGNOSIS — Z51.6 NEED FOR DESENSITIZATION TO ALLERGENS: Primary | ICD-10-CM

## 2019-05-09 PROCEDURE — 95165 ANTIGEN THERAPY SERVICES: CPT | Performed by: ALLERGY & IMMUNOLOGY

## 2019-05-09 NOTE — TELEPHONE ENCOUNTER
Allergy serums received at [facility].     Vials received below:    Vial Color Content                      Vial Size Expiration Date  Red 1:1 Trees, Weeds 5mL  05/07/2020    Signature  Nupur Yost, [facility] ......5/9/2019...2:38 PM

## 2019-05-09 NOTE — PROGRESS NOTES
Allergy serums billed at [place].     Vials received below:    Vial Color Content                      Vial Size Expiration Date  Red 1:1 Trees, Weeds 5mL  05/07/2020    Original Refill encounter date: 05/03/2019      Signature  Nupur Yost [facility] ......5/9/2019...2:42 PM

## 2019-05-17 ENCOUNTER — ALLIED HEALTH/NURSE VISIT (OUTPATIENT)
Dept: ALLERGY | Facility: CLINIC | Age: 39
End: 2019-05-17
Payer: COMMERCIAL

## 2019-05-17 DIAGNOSIS — J30.1 SEASONAL ALLERGIC RHINITIS DUE TO POLLEN: Primary | ICD-10-CM

## 2019-05-17 PROCEDURE — 99207 ZZC DROP WITH A PROCEDURE: CPT

## 2019-05-17 PROCEDURE — 95115 IMMUNOTHERAPY ONE INJECTION: CPT

## 2019-05-17 NOTE — PROGRESS NOTES
Patient presented after waiting 30 minutes with no reaction to allergy injections. Discharged from clinic.    Spike Trevino RN....5/17/2019 11:05 AM

## 2019-05-24 ENCOUNTER — ALLIED HEALTH/NURSE VISIT (OUTPATIENT)
Dept: ALLERGY | Facility: CLINIC | Age: 39
End: 2019-05-24
Payer: COMMERCIAL

## 2019-05-24 DIAGNOSIS — J30.1 SEASONAL ALLERGIC RHINITIS DUE TO POLLEN: Primary | ICD-10-CM

## 2019-05-24 PROCEDURE — 99207 ZZC DROP WITH A PROCEDURE: CPT

## 2019-05-24 PROCEDURE — 95117 IMMUNOTHERAPY INJECTIONS: CPT

## 2019-05-24 NOTE — PROGRESS NOTES
Patient presented after waiting 30 minutes with no reaction to allergy injections. Discharged from clinic.    Jackeline Rutherford RN ............   5/24/2019...11:21 AM

## 2019-05-31 ENCOUNTER — OFFICE VISIT (OUTPATIENT)
Dept: ALLERGY | Facility: CLINIC | Age: 39
End: 2019-05-31
Payer: COMMERCIAL

## 2019-05-31 ENCOUNTER — ALLIED HEALTH/NURSE VISIT (OUTPATIENT)
Dept: ALLERGY | Facility: CLINIC | Age: 39
End: 2019-05-31
Payer: COMMERCIAL

## 2019-05-31 VITALS
WEIGHT: 156 LBS | DIASTOLIC BLOOD PRESSURE: 85 MMHG | TEMPERATURE: 98 F | HEART RATE: 82 BPM | SYSTOLIC BLOOD PRESSURE: 127 MMHG | HEIGHT: 67 IN | OXYGEN SATURATION: 98 % | BODY MASS INDEX: 24.48 KG/M2

## 2019-05-31 DIAGNOSIS — J30.89 ALLERGIC RHINITIS DUE TO DUST MITE: Primary | ICD-10-CM

## 2019-05-31 DIAGNOSIS — J30.1 SEASONAL ALLERGIC RHINITIS DUE TO POLLEN: Primary | ICD-10-CM

## 2019-05-31 DIAGNOSIS — J30.1 CHRONIC SEASONAL ALLERGIC RHINITIS DUE TO POLLEN: ICD-10-CM

## 2019-05-31 PROCEDURE — 99207 ZZC DROP WITH A PROCEDURE: CPT

## 2019-05-31 PROCEDURE — 99214 OFFICE O/P EST MOD 30 MIN: CPT | Performed by: ALLERGY & IMMUNOLOGY

## 2019-05-31 PROCEDURE — 95115 IMMUNOTHERAPY ONE INJECTION: CPT

## 2019-05-31 RX ORDER — MONTELUKAST SODIUM 10 MG/1
10 TABLET ORAL AT BEDTIME
Qty: 30 TABLET | Refills: 11 | Status: SHIPPED | OUTPATIENT
Start: 2019-05-31

## 2019-05-31 RX ORDER — AZELASTINE 1 MG/ML
2 SPRAY, METERED NASAL 2 TIMES DAILY
Qty: 1 BOTTLE | Refills: 11 | Status: SHIPPED | OUTPATIENT
Start: 2019-05-31

## 2019-05-31 RX ORDER — CETIRIZINE HYDROCHLORIDE 10 MG/1
10 TABLET ORAL DAILY
Qty: 30 TABLET | Refills: 11 | Status: SHIPPED | OUTPATIENT
Start: 2019-05-31

## 2019-05-31 RX ORDER — FLUTICASONE PROPIONATE 50 MCG
2 SPRAY, SUSPENSION (ML) NASAL DAILY
Qty: 16 G | Refills: 11 | Status: SHIPPED | OUTPATIENT
Start: 2019-05-31

## 2019-05-31 ASSESSMENT — MIFFLIN-ST. JEOR: SCORE: 1576.24

## 2019-05-31 NOTE — PROGRESS NOTES
Roxanna Tao is a 39 year old  male with previous medical history significant for allergic rhinitis who returns for a follow up visit.     Patient is on allergen immunotherapy for dust mite, trees and weeds.  Patient continues to have sneezing, rhinorrhea and ocular itching.  Symptoms are intermittent but when they occur are quite severe.  He is on Singulair and Zyrtec.  No use of nasal steroid or astelin.  I think allergy shots have been somewhat beneficial.  He is tolerating allergen immunotherapy.  On initial testing this was done by blood testing.  He has never had skin testing.  They are concerned that potentially there may be other allergens and are requesting skin testing to be done.    History reviewed. No pertinent past medical history.  History reviewed. No pertinent family history.  History reviewed. No pertinent surgical history.    REVIEW OF SYSTEMS:  General: negative for weight gain. negative for weight loss. negative for changes in sleep.   Ears: negative for fullness. negative for hearing loss. negative for dizziness.   Nose: negative for snoring.positive  for changes in smell. positive  for drainage.   Eyes: positive  for eye watering. positive  for eye itching. negative for vision changes. negative for eye redness.  Throat: negative for hoarseness. negative for sore throat. negative for trouble swallowing.   Lungs: negative for shortness of breath.negative for wheezing. negative for sputum production.   Cardiovascular: negative for chest pain. negative for swelling of ankles. negative for fast or irregular heartbeat.   Gastrointestinal: negative for nausea. negative for heartburn. negative for acid reflux.   Musculoskeletal: negative for joint pain. negative for joint stiffness. negative for joint swelling.   Neurologic: negative for seizures. negative for fainting. negative for weakness.   Psychiatric: negative for changes in mood. negative for anxiety.   Endocrine: negative for cold  intolerance. negative for heat intolerance. negative for tremors.   Lymphatic: negative for lower extremity swelling. negative for lymph node swelling.   Hematologic: negative for easy bruising. negative for easy bleeding.  Integumentary: negative for rash. negative for scaling. negative for nail changes.       Current Outpatient Medications:      azelastine (ASTELIN) 0.1 % nasal spray, Spray 2 sprays into both nostrils 2 times daily, Disp: 1 Bottle, Rfl: 11     cetirizine (ZYRTEC) 10 MG tablet, Take 1 tablet (10 mg) by mouth daily, Disp: 30 tablet, Rfl: 11     EPINEPHrine (EPIPEN/ADRENACLICK/OR ANY BX GENERIC EQUIV) 0.3 MG/0.3ML injection 2-pack, Inject 0.3 mLs (0.3 mg) into the muscle as needed for anaphylaxis, Disp: 0.6 mL, Rfl: 1     fluticasone (FLONASE) 50 MCG/ACT nasal spray, Spray 2 sprays into both nostrils daily Office visit for further refills, Disp: 16 g, Rfl: 11     montelukast (SINGULAIR) 10 MG tablet, Take 1 tablet (10 mg) by mouth At Bedtime Office visit for further refills., Disp: 30 tablet, Rfl: 11     Montelukast Sodium (SINGULAIR PO), , Disp: , Rfl:      ORDER FOR ALLERGEN IMMUNOTHERAPY, Dust Mites DF 30,000AU/mL, HS  0.3 ml Dust Mites DP. 30,000 AU/mL, HS  0.3 ml  Jose, White 1:20 w/v, HS  1.0 ml Lamb's Quarters 1:20 w/v, HS 1.0 ml Diluent: HSA qs to 5ml, Disp: 5 mL, Rfl: prn     fluticasone (FLONASE) 50 MCG/ACT spray, Spray 2 sprays into both nostrils daily, Disp: , Rfl:     There is no immunization history on file for this patient.  No Known Allergies      EXAM:   Constitutional:  Appears well-developed and well-nourished. No distress.   HEENT:   Head: Normocephalic.   Boggy nasal tissue and pale.    Eyes: Conjunctivae are non-erythematous   No maxillary or frontal sinus tenderness to palpation.   Cardiovascular: Normal rate, regular rhythm and normal heart sounds. Exam reveals no gallop and no friction rub.   No murmur heard.  Respiratory: Effort normal and breath sounds normal. No respiratory  distress. No wheezes. No rales.   Musculoskeletal: Normal range of motion.   Neuro: Oriented to person, place, and time.  Skin: Skin is warm and dry. No rash noted.   Psychiatric: Normal mood and affect.     Nursing note and vitals reviewed.    ASSESSMENT/PLAN:  Problem List Items Addressed This Visit        Respiratory    Allergic rhinitis due to dust mite - Primary     Perennial nasal and ocular symptoms for the last 11 years. Flonase has been used and non-beneficial. Zyrtec, montelukast and other oral antihistamines have been non-beneficial.   On allergen immunotherapy for 1 year.  Shots of been helpful.  However, still with intermittent but severe nasal and ocular symptoms.           - Flonase 50mcg 2 sprays/nostril q day.   - Azelastine 2 sprays/nostril twice daily as needed.   - Cetirizine (Zyrtec) 10mg by mouth daily to twice daily.  - Singulair 10mg by mouth daily at night.   - Continue allergy shots. Return for allergy skin testing.          Relevant Medications    fluticasone (FLONASE) 50 MCG/ACT nasal spray    azelastine (ASTELIN) 0.1 % nasal spray    montelukast (SINGULAIR) 10 MG tablet    cetirizine (ZYRTEC) 10 MG tablet    Chronic seasonal allergic rhinitis due to pollen    Relevant Medications    fluticasone (FLONASE) 50 MCG/ACT nasal spray    azelastine (ASTELIN) 0.1 % nasal spray    montelukast (SINGULAIR) 10 MG tablet    cetirizine (ZYRTEC) 10 MG tablet          Chart documentation with Dragon Voice recognition Software. Although reviewed after completion, some words and grammatical errors may remain.    Ray Mcleod DO FAAAAI  Allergy/Immunology  [facility], [place]

## 2019-05-31 NOTE — LETTER
5/31/2019         RE: Roxanna Tao  [address] [facility] [address]        Dear Colleague,    Thank you for referring your patient, Roxanna Tao, to the [facility]. Please see a copy of my visit note below.    Roxanna Tao is a 39 year old  male with previous medical history significant for allergic rhinitis who returns for a follow up visit.     Patient is on allergen immunotherapy for dust mite, trees and weeds.  Patient continues to have sneezing, rhinorrhea and ocular itching.  Symptoms are intermittent but when they occur are quite severe.  He is on Singulair and Zyrtec.  No use of nasal steroid or astelin.  I think allergy shots have been somewhat beneficial.  He is tolerating allergen immunotherapy.  On initial testing this was done by blood testing.  He has never had skin testing.  They are concerned that potentially there may be other allergens and are requesting skin testing to be done.    History reviewed. No pertinent past medical history.  History reviewed. No pertinent family history.  History reviewed. No pertinent surgical history.    REVIEW OF SYSTEMS:  General: negative for weight gain. negative for weight loss. negative for changes in sleep.   Ears: negative for fullness. negative for hearing loss. negative for dizziness.   Nose: negative for snoring.positive  for changes in smell. positive  for drainage.   Eyes: positive  for eye watering. positive  for eye itching. negative for vision changes. negative for eye redness.  Throat: negative for hoarseness. negative for sore throat. negative for trouble swallowing.   Lungs: negative for shortness of breath.negative for wheezing. negative for sputum production.   Cardiovascular: negative for chest pain. negative for swelling of ankles. negative for fast or irregular heartbeat.   Gastrointestinal: negative for nausea. negative for heartburn. negative for acid reflux.   Musculoskeletal: negative for joint  pain. negative for joint stiffness. negative for joint swelling.   Neurologic: negative for seizures. negative for fainting. negative for weakness.   Psychiatric: negative for changes in mood. negative for anxiety.   Endocrine: negative for cold intolerance. negative for heat intolerance. negative for tremors.   Lymphatic: negative for lower extremity swelling. negative for lymph node swelling.   Hematologic: negative for easy bruising. negative for easy bleeding.  Integumentary: negative for rash. negative for scaling. negative for nail changes.       Current Outpatient Medications:      azelastine (ASTELIN) 0.1 % nasal spray, Spray 2 sprays into both nostrils 2 times daily, Disp: 1 Bottle, Rfl: 11     cetirizine (ZYRTEC) 10 MG tablet, Take 1 tablet (10 mg) by mouth daily, Disp: 30 tablet, Rfl: 11     EPINEPHrine (EPIPEN/ADRENACLICK/OR ANY BX GENERIC EQUIV) 0.3 MG/0.3ML injection 2-pack, Inject 0.3 mLs (0.3 mg) into the muscle as needed for anaphylaxis, Disp: 0.6 mL, Rfl: 1     fluticasone (FLONASE) 50 MCG/ACT nasal spray, Spray 2 sprays into both nostrils daily Office visit for further refills, Disp: 16 g, Rfl: 11     montelukast (SINGULAIR) 10 MG tablet, Take 1 tablet (10 mg) by mouth At Bedtime Office visit for further refills., Disp: 30 tablet, Rfl: 11     Montelukast Sodium (SINGULAIR PO), , Disp: , Rfl:      ORDER FOR ALLERGEN IMMUNOTHERAPY, Dust Mites DF 30,000AU/mL, HS  0.3 ml Dust Mites DP. 30,000 AU/mL, HS  0.3 ml  Jose, White 1:20 w/v, HS  1.0 ml Lamb's Quarters 1:20 w/v, HS 1.0 ml Diluent: HSA qs to 5ml, Disp: 5 mL, Rfl: prn     fluticasone (FLONASE) 50 MCG/ACT spray, Spray 2 sprays into both nostrils daily, Disp: , Rfl:     There is no immunization history on file for this patient.  No Known Allergies      EXAM:   Constitutional:  Appears well-developed and well-nourished. No distress.   HEENT:   Head: Normocephalic.   Boggy nasal tissue and pale.    Eyes: Conjunctivae are non-erythematous   No  maxillary or frontal sinus tenderness to palpation.   Cardiovascular: Normal rate, regular rhythm and normal heart sounds. Exam reveals no gallop and no friction rub.   No murmur heard.  Respiratory: Effort normal and breath sounds normal. No respiratory distress. No wheezes. No rales.   Musculoskeletal: Normal range of motion.   Neuro: Oriented to person, place, and time.  Skin: Skin is warm and dry. No rash noted.   Psychiatric: Normal mood and affect.     Nursing note and vitals reviewed.    ASSESSMENT/PLAN:  Problem List Items Addressed This Visit        Respiratory    Allergic rhinitis due to dust mite - Primary     Perennial nasal and ocular symptoms for the last 11 years. Flonase has been used and non-beneficial. Zyrtec, montelukast and other oral antihistamines have been non-beneficial.   On allergen immunotherapy for 1 year.  Shots of been helpful.  However, still with intermittent but severe nasal and ocular symptoms.           - Flonase 50mcg 2 sprays/nostril q day.   - Azelastine 2 sprays/nostril twice daily as needed.   - Cetirizine (Zyrtec) 10mg by mouth daily to twice daily.  - Singulair 10mg by mouth daily at night.   - Continue allergy shots. Return for allergy skin testing.          Relevant Medications    fluticasone (FLONASE) 50 MCG/ACT nasal spray    azelastine (ASTELIN) 0.1 % nasal spray    montelukast (SINGULAIR) 10 MG tablet    cetirizine (ZYRTEC) 10 MG tablet    Chronic seasonal allergic rhinitis due to pollen    Relevant Medications    fluticasone (FLONASE) 50 MCG/ACT nasal spray    azelastine (ASTELIN) 0.1 % nasal spray    montelukast (SINGULAIR) 10 MG tablet    cetirizine (ZYRTEC) 10 MG tablet          Chart documentation with Dragon Voice recognition Software. Although reviewed after completion, some words and grammatical errors may remain.    Ray Mcleod DO FAAAAI  Allergy/Immunology  [place], [place]      Again, thank you for allowing me to participate in  the care of your patient.        Sincerely,        Ray Mcleod, DO

## 2019-05-31 NOTE — PATIENT INSTRUCTIONS
Allergy Staff Appt Hours Shot Hours Locations    Physician     Ray Mcleod DO       Support Staff     SHLOMO Wood, NAVJOT  Tuesday:        [place] 7-4:20     Wednesday:        [place]: 7-5     Thursday:                    [place] 7-6:40     Friday:        [place] 7-2:40   [place]        Thursday: 1-5:50        Friday: 7-10:50     [place]        Tuesday: 7- 3:20        Wednesday: 7-4:20     [place] Monday: 7-4:20        Tuesday: 1-6:20         [facility]  [address] [place] [address]  Appt Line: [phone]  Allergy RN:  [phone]    [facility]  [address] [place] [address]  Appt Line: [phone]  Allergy RN:  [phone]       Important Scheduling Information  Aspirin Desensitization: Appt will last 2 clinic days. Please call the Allergy RN line for your clinic to schedule. Discontinue antihistamines 7 days prior to the appointment.     Food Challenges: Appt will last 3-4 hours. Please call the Allergy RN line for your clinic to schedule. Discontinue antihistamines 7 days prior to the appointment.     Penicillin Testing: Appt will last 2-3 hours. Please call the Allergy RN line for your clinic to schedule. Discontinue antihistamines 7 days prior to the appointment.     Skin Testing: Appt will about 40 minutes. Call the appointment line for your clinic to schedule. Discontinue antihistamines 7 days prior to the appointment.     Venom Testing: Appt will last 2-3 hours. Please call the Allergy RN line for your clinic to schedule. Discontinue antihistamines 7 days prior to the appointment.     Thank you for trusting us with your Allergy, Asthma, and Immunology care. Please feel free to contact us with any questions or concerns you may have.      - Singulair 10mg by mouth daily at night.   - Zyrtec 10mg by mouth once to twice daily as needed. Hold for 7 days piror to skin testing.   - Flonase 2 sprays/nostril daily.   - Azelastine 2 sprays/nostril twice  daily as needed. Hold for 2 days prior to allergy testing.   - Continue allergy shots.

## 2019-05-31 NOTE — ASSESSMENT & PLAN NOTE
Perennial nasal and ocular symptoms for the last 11 years. Flonase has been used and non-beneficial. Zyrtec, montelukast and other oral antihistamines have been non-beneficial.   On allergen immunotherapy for 1 year.  Shots of been helpful.  However, still with intermittent but severe nasal and ocular symptoms.           - Flonase 50mcg 2 sprays/nostril q day.   - Azelastine 2 sprays/nostril twice daily as needed.   - Cetirizine (Zyrtec) 10mg by mouth daily to twice daily.  - Singulair 10mg by mouth daily at night.   - Continue allergy shots. Return for allergy skin testing.

## 2019-05-31 NOTE — PROGRESS NOTES
Patient did not return to allergy shot room to receive post injection assessment. Attempted to reach patient by phone but there was no answer. Unable to confirm patient is not experiencing reaction to injections. Jackeline Rutherford RN

## 2019-06-07 ENCOUNTER — OFFICE VISIT (OUTPATIENT)
Dept: ALLERGY | Facility: CLINIC | Age: 39
End: 2019-06-07
Payer: COMMERCIAL

## 2019-06-07 VITALS
TEMPERATURE: 98 F | BODY MASS INDEX: 24.48 KG/M2 | DIASTOLIC BLOOD PRESSURE: 80 MMHG | OXYGEN SATURATION: 100 % | SYSTOLIC BLOOD PRESSURE: 114 MMHG | HEART RATE: 70 BPM | WEIGHT: 156 LBS | HEIGHT: 67 IN

## 2019-06-07 DIAGNOSIS — J30.1 CHRONIC SEASONAL ALLERGIC RHINITIS DUE TO POLLEN: ICD-10-CM

## 2019-06-07 DIAGNOSIS — J30.89 ALLERGIC RHINITIS DUE TO DUST MITE: Primary | ICD-10-CM

## 2019-06-07 PROCEDURE — 95004 PERQ TESTS W/ALRGNC XTRCS: CPT | Performed by: ALLERGY & IMMUNOLOGY

## 2019-06-07 PROCEDURE — 99207 ZZC DROP WITH A PROCEDURE: CPT | Performed by: ALLERGY & IMMUNOLOGY

## 2019-06-07 ASSESSMENT — MIFFLIN-ST. JEOR: SCORE: 1576.24

## 2019-06-07 NOTE — PROGRESS NOTES
Roxanna Tao is a 39 year old  male with previous medical history significant for allergic rhinitis who returns for a follow up visit.     Patient presents today for allergy skin testing. The patient is currently in a good state of health. No recent fevers, chills, cough, wheezing, shortness of breath, skin rash, angioedema, nausea, vomiting or diarrhea. The risks and benefits were discussed and the patient/patient's family wishes to proceed. The consent was signed.    History reviewed. No pertinent past medical history.  History reviewed. No pertinent family history.  History reviewed. No pertinent surgical history.    REVIEW OF SYSTEMS:  General: negative for weight gain. negative for weight loss. negative for changes in sleep.   Ears: negative for fullness. negative for hearing loss. negative for dizziness.   Nose: negative for snoring.negative for changes in smell. negative for drainage.   Eyes: negative for eye watering. negative for eye itching. negative for vision changes. negative for eye redness.  Throat: negative for hoarseness. negative for sore throat. negative for trouble swallowing.   Lungs: negative for shortness of breath.negative for wheezing. negative for sputum production.   Cardiovascular: negative for chest pain. negative for swelling of ankles. negative for fast or irregular heartbeat.   Gastrointestinal: negative for nausea. negative for heartburn. negative for acid reflux.   Musculoskeletal: negative for joint pain. negative for joint stiffness. negative for joint swelling.   Neurologic: negative for seizures. negative for fainting. negative for weakness.   Psychiatric: negative for changes in mood. negative for anxiety.   Endocrine: negative for cold intolerance. negative for heat intolerance. negative for tremors.   Lymphatic: negative for lower extremity swelling. negative for lymph node swelling.   Hematologic: negative for easy bruising. negative for easy  bleeding.  Integumentary: negative for rash. negative for scaling. negative for nail changes.       Current Outpatient Medications:      azelastine (ASTELIN) 0.1 % nasal spray, Spray 2 sprays into both nostrils 2 times daily, Disp: 1 Bottle, Rfl: 11     cetirizine (ZYRTEC) 10 MG tablet, Take 1 tablet (10 mg) by mouth daily, Disp: 30 tablet, Rfl: 11     EPINEPHrine (EPIPEN/ADRENACLICK/OR ANY BX GENERIC EQUIV) 0.3 MG/0.3ML injection 2-pack, Inject 0.3 mLs (0.3 mg) into the muscle as needed for anaphylaxis, Disp: 0.6 mL, Rfl: 1     fluticasone (FLONASE) 50 MCG/ACT nasal spray, Spray 2 sprays into both nostrils daily Office visit for further refills, Disp: 16 g, Rfl: 11     fluticasone (FLONASE) 50 MCG/ACT spray, Spray 2 sprays into both nostrils daily, Disp: , Rfl:      montelukast (SINGULAIR) 10 MG tablet, Take 1 tablet (10 mg) by mouth At Bedtime Office visit for further refills., Disp: 30 tablet, Rfl: 11     Montelukast Sodium (SINGULAIR PO), , Disp: , Rfl:      ORDER FOR ALLERGEN IMMUNOTHERAPY, Dust Mites DF 30,000AU/mL, HS  0.3 ml Dust Mites DP. 30,000 AU/mL, HS  0.3 ml  Jose, White 1:20 w/v, HS  1.0 ml Lamb's Quarters 1:20 w/v, HS 1.0 ml Diluent: HSA qs to 5ml, Disp: 5 mL, Rfl: prn    There is no immunization history on file for this patient.  No Known Allergies        WORKUP:  ENVIRONMENTAL PERCUTANEOUS SKIN TESTING: ADULT  [facility] 6/7/2019   Consent Y   Ordering Physician Harsha   Interpreting Physician Harsha   Testing Technician Aparna DIALLO   Location Back   Time start:  7:45 AM   Time End:  8:00 AM   Positive Control: Histatrol*ALK 1 mg/ml 5/32   Negative Control: 50% Glycerin 0   Cat Hair*ALK (10,000 BAU/ml) 0   AP Dog Hair/Dander (1:100 w/v) 0   Dust Mite p. 30,000 AU/ml 4/15   Dust Mite f. (30,000 AU/ml) 3/20   Santy (W/F in millimeters) 0   Juan Grass (100,000 BAU/mL) 0   Red Cedar (W/F in millimeters) 0   Maple/[place] (W/F in millimeters) 0   Hackberry (W/F in millimeters) 0   [place]  (W/F in millimeters) 0   Door *ALK (W/F in millimeters) 0   American Elm (W/F in millimeters) 0   [place] (W/F in millimeters) 0   Black [place] (W/F in millimeters) 0   Birch Mix (W/F in millimeters) 4/10   [place] (W/F in millimeters) 0   Oak (W/F in millimeters) 0   Cocklebur (W/F in millimeters) 0   [place] (W/F in millimeters) 0   White Jose (W/F in millimeters) 0   Careless (W/F in millimeters) 0   Nettle (W/F in millimeters) 0   English Plantain (W/F in millimeters) 0   Kochia (W/F in millimeters) 0   Lamb's Quarter (W/F in millimeters) 0   Marshelder (W/F in millimeters) 0   Ragweed Mix* ALK (W/F in millimeters) 0   Russian Thistle (W/F in millimeters) 4/4   Sagebrush/Mugwort (W/F in millimeters) 0   Sheep Sorrel (W/F in millimeters) 0   Feather Mix* ALK (W/F in millimeters) 0   Penicillium Mix (1:10 w/v) 0   Curvularia spicifera (1:10 w/v) 0   Epicoccum (1:10 w/v) 0   Aspergillus fumigatus (1:10 w/v): 0   Alternaria tenius (1:10 w/v) 0   H. Cladosporium (1:10 w/v) 0   Phoma herbarum (1:10 w/v) 0        ASSESSMENT/PLAN:  Problem List Items Addressed This Visit        Respiratory    Allergic rhinitis due to dust mite - Primary     Perennial nasal and ocular symptoms for the last 11 years. Flonase has been used and non-beneficial. Zyrtec, montelukast and other oral antihistamines have been non-beneficial.     On allergen immunotherapy for 1 year.  Shots of been helpful. However, still with intermittent but severe nasal and ocular symptoms.       Skin testing positive for dust mites, birch and russian thistle. Blood testing positive for jose, dust mites and lamb's quarter.     Discussed with patient that I think dust mites is main cause of symptoms and that I would continue allergy shots. If he wants to include birch and russian thistle into allergy shots he would need to build from green in order to do such as testing initially based on blood testing results. He opted to continue allergy shots as is.        - Flonase 50mcg 2 sprays/nostril q day.   - Azelastine 2 sprays/nostril twice daily as needed.   - Cetirizine (Zyrtec) 10mg by mouth daily to twice daily.  - Singulair 10mg by mouth daily at night.   - Continue allergy shots.          Relevant Orders    ALLERGY SKIN TESTS,ALLERGENS [41865] (Completed)    Chronic seasonal allergic rhinitis due to pollen    Relevant Orders    ALLERGY SKIN TESTS,ALLERGENS [31215] (Completed)          Chart documentation with Dragon Voice recognition Software. Although reviewed after completion, some words and grammatical errors may remain.    Ray Mcleod DO FAAAAI  Allergy/Immunology  [facility]-[place], [place]

## 2019-06-07 NOTE — ASSESSMENT & PLAN NOTE
Perennial nasal and ocular symptoms for the last 11 years. Flonase has been used and non-beneficial. Zyrtec, montelukast and other oral antihistamines have been non-beneficial.     On allergen immunotherapy for 1 year.  Shots of been helpful. However, still with intermittent but severe nasal and ocular symptoms.       Skin testing positive for dust mites, birch and russian thistle. Blood testing positive for lulu, dust mites and lamb's quarter.     Discussed with patient that I think dust mites is main cause of symptoms and that I would continue allergy shots. If he wants to include birch and russian thistle into allergy shots he would need to build from green in order to do such as testing initially based on blood testing results. He opted to continue allergy shots as is.       - Flonase 50mcg 2 sprays/nostril q day.   - Azelastine 2 sprays/nostril twice daily as needed.   - Cetirizine (Zyrtec) 10mg by mouth daily to twice daily.  - Singulair 10mg by mouth daily at night.   - Continue allergy shots.

## 2020-06-09 DIAGNOSIS — J30.1 CHRONIC SEASONAL ALLERGIC RHINITIS DUE TO POLLEN: ICD-10-CM

## 2020-06-09 RX ORDER — FLUTICASONE PROPIONATE 50 MCG
2 SPRAY, SUSPENSION (ML) NASAL DAILY
Qty: 16 G | Refills: 11 | OUTPATIENT
Start: 2020-06-09

## 2020-06-09 RX ORDER — CETIRIZINE HYDROCHLORIDE 10 MG/1
10 TABLET ORAL DAILY
Qty: 30 TABLET | Refills: 11 | OUTPATIENT
Start: 2020-06-09

## 2020-06-09 RX ORDER — MONTELUKAST SODIUM 10 MG/1
10 TABLET ORAL AT BEDTIME
Qty: 30 TABLET | Refills: 11 | OUTPATIENT
Start: 2020-06-09

## 2020-06-09 NOTE — TELEPHONE ENCOUNTER
"Requested Prescriptions   Pending Prescriptions Disp Refills     montelukast (SINGULAIR) 10 MG tablet 30 tablet 11     Sig: Take 1 tablet (10 mg) by mouth At Bedtime Office visit for further refills.       Leukotriene Inhibitors Protocol Failed - 6/9/2020  9:32 AM        Failed - Recent (12 mo) or future (30 days) visit within the authorizing provider's specialty     Patient has had an office visit with the authorizing provider or a provider within the authorizing providers department within the previous 12 mos or has a future within next 30 days. See \"Patient Info\" tab in inbasket, or \"Choose Columns\" in Meds & Orders section of the refill encounter.              Passed - Patient is age 12 or older     If patient is under 16, ok to refill using age based dosing.           Passed - Medication is active on med list           fluticasone (FLONASE) 50 MCG/ACT nasal spray 16 g 11     Sig: Spray 2 sprays into both nostrils daily Office visit for further refills       Nasal Allergy Protocol Failed - 6/9/2020  9:32 AM        Failed - Recent (12 mo) or future (30 days) visit within the authorizing provider's specialty     Patient has had an office visit with the authorizing provider or a provider within the authorizing providers department within the previous 12 mos or has a future within next 30 days. See \"Patient Info\" tab in inbasket, or \"Choose Columns\" in Meds & Orders section of the refill encounter.              Passed - Patient is age 12 or older        Passed - Medication is active on med list           cetirizine (ZYRTEC) 10 MG tablet 30 tablet 11     Sig: Take 1 tablet (10 mg) by mouth daily       Antihistamines Protocol Failed - 6/9/2020  9:32 AM        Failed - Recent (12 mo) or future (30 days) visit within the authorizing provider's specialty     Patient has had an office visit with the authorizing provider or a provider within the authorizing providers department within the previous 12 mos or has a future " "within next 30 days. See \"Patient Info\" tab in inbasket, or \"Choose Columns\" in Meds & Orders section of the refill encounter.              Passed - Patient is 3-64 years of age     Apply weight-based dosing for peds patients age 3 - 12 years of age.    Forward request to provider for patients under the age of 3 or over the age of 64.          Passed - Medication is active on med list           Routing refill request to provider for review/approval because:  Patient needs to be seen because it has been more than 1 year since last office visit.      Naheed Morocho RN    "

## 2020-06-09 NOTE — TELEPHONE ENCOUNTER
Pt called, he is currently working at this time and will call back to schedule virtual visit.       Kelly Urban MA

## 2020-06-15 ENCOUNTER — TELEPHONE (OUTPATIENT)
Dept: ALLERGY | Facility: OTHER | Age: 40
End: 2020-06-15

## 2020-06-15 NOTE — TELEPHONE ENCOUNTER
Violette from [facility] pharmacy is checking on the refill requests for, singulair, flonase and sitergin. Please advise

## 2020-06-16 NOTE — TELEPHONE ENCOUNTER
Per separate encounter on 6/9/20 patient needs appt prior to refilling medications.  He was notified and declined scheduling appt at that time.    Message left for pharmacy regarding flonase, Singulair and cetirizine.    Aparna Alex RN

## 2022-08-04 NOTE — PROGRESS NOTES
[facility] Clinical Pharmacy Services: Pharmacy Consult for COVID-19 Vaccine  Date & time consult received by pharmacy:8/4 [id number]  Consulting Provider: Dr. Tomlinson    Pharmacy has been consulted to consent this patient for COVID-19 vaccine. Due to staffing and the length of time this process takes, pharmacy is currently only offering this service Monday through Friday (excluding holidays) from 7:30 am to 4 pm. Pharmacy acknowledges receipt of the request and this consult will be addressed in full during the next available business day/time.     If any extenuating circumstances exist such that the consult cannot wait until the next business day, please contact pharmacy at [phone] to discuss options.    Thank you for this consult,    ANDREW ALONZO [facility]  Clinical Pharmacist    Patient presented after waiting 30 minutes with no reaction to allergy injections. Discharged from clinic.    Jaelyn Claire RN